# Patient Record
Sex: MALE | Race: BLACK OR AFRICAN AMERICAN | NOT HISPANIC OR LATINO
[De-identification: names, ages, dates, MRNs, and addresses within clinical notes are randomized per-mention and may not be internally consistent; named-entity substitution may affect disease eponyms.]

---

## 2017-03-22 ENCOUNTER — HOSPITAL (OUTPATIENT)
Dept: OTHER | Age: 27
End: 2017-03-22
Attending: EMERGENCY MEDICINE

## 2017-05-15 ENCOUNTER — HOSPITAL (OUTPATIENT)
Dept: OTHER | Age: 27
End: 2017-05-15
Attending: EMERGENCY MEDICINE

## 2017-05-16 PROBLEM — J45.909 ASTHMA: Status: ACTIVE | Noted: 2017-05-16

## 2017-05-16 PROBLEM — J45.21 MILD INTERMITTENT ASTHMA WITH ACUTE EXACERBATION: Status: ACTIVE | Noted: 2017-05-16

## 2017-06-01 PROBLEM — J45.21 MILD INTERMITTENT ASTHMA WITH ACUTE EXACERBATION: Status: RESOLVED | Noted: 2017-05-16 | Resolved: 2017-06-01

## 2017-06-01 PROBLEM — J45.909 ASTHMA: Status: RESOLVED | Noted: 2017-05-16 | Resolved: 2017-06-01

## 2017-10-24 ENCOUNTER — APPOINTMENT (OUTPATIENT)
Dept: GENERAL RADIOLOGY | Facility: HOSPITAL | Age: 27
End: 2017-10-24
Attending: EMERGENCY MEDICINE
Payer: COMMERCIAL

## 2017-10-24 ENCOUNTER — HOSPITAL ENCOUNTER (OUTPATIENT)
Facility: HOSPITAL | Age: 27
Setting detail: OBSERVATION
Discharge: HOME | End: 2017-10-27
Attending: EMERGENCY MEDICINE | Admitting: HOSPITALIST
Payer: COMMERCIAL

## 2017-10-24 DIAGNOSIS — J45.41 MODERATE PERSISTENT ASTHMA WITH EXACERBATION: Primary | ICD-10-CM

## 2017-10-24 PROBLEM — E87.6 HYPOKALEMIA: Status: ACTIVE | Noted: 2017-10-24

## 2017-10-24 PROCEDURE — 87999 UNLISTED MICROBIOLOGY PX: CPT

## 2017-10-24 PROCEDURE — 80053 COMPREHEN METABOLIC PANEL: CPT | Performed by: EMERGENCY MEDICINE

## 2017-10-24 PROCEDURE — 85025 COMPLETE CBC W/AUTO DIFF WBC: CPT | Performed by: EMERGENCY MEDICINE

## 2017-10-24 PROCEDURE — 71010 XR CHEST AP PORTABLE  (CPT=71010): CPT | Performed by: EMERGENCY MEDICINE

## 2017-10-24 PROCEDURE — 87798 DETECT AGENT NOS DNA AMP: CPT | Performed by: EMERGENCY MEDICINE

## 2017-10-24 PROCEDURE — 99285 EMERGENCY DEPT VISIT HI MDM: CPT

## 2017-10-24 PROCEDURE — 87581 M.PNEUMON DNA AMP PROBE: CPT | Performed by: EMERGENCY MEDICINE

## 2017-10-24 PROCEDURE — 83880 ASSAY OF NATRIURETIC PEPTIDE: CPT | Performed by: EMERGENCY MEDICINE

## 2017-10-24 PROCEDURE — 96375 TX/PRO/DX INJ NEW DRUG ADDON: CPT

## 2017-10-24 PROCEDURE — 93005 ELECTROCARDIOGRAM TRACING: CPT

## 2017-10-24 PROCEDURE — 85378 FIBRIN DEGRADE SEMIQUANT: CPT | Performed by: EMERGENCY MEDICINE

## 2017-10-24 PROCEDURE — 84484 ASSAY OF TROPONIN QUANT: CPT | Performed by: EMERGENCY MEDICINE

## 2017-10-24 PROCEDURE — 96365 THER/PROPH/DIAG IV INF INIT: CPT

## 2017-10-24 PROCEDURE — 87486 CHLMYD PNEUM DNA AMP PROBE: CPT | Performed by: EMERGENCY MEDICINE

## 2017-10-24 PROCEDURE — 87633 RESP VIRUS 12-25 TARGETS: CPT | Performed by: EMERGENCY MEDICINE

## 2017-10-24 PROCEDURE — 93010 ELECTROCARDIOGRAM REPORT: CPT

## 2017-10-24 PROCEDURE — 94644 CONT INHLJ TX 1ST HOUR: CPT

## 2017-10-24 RX ORDER — METHYLPREDNISOLONE SODIUM SUCCINATE 125 MG/2ML
125 INJECTION, POWDER, LYOPHILIZED, FOR SOLUTION INTRAMUSCULAR; INTRAVENOUS ONCE
Status: COMPLETED | OUTPATIENT
Start: 2017-10-24 | End: 2017-10-24

## 2017-10-25 PROCEDURE — 85025 COMPLETE CBC W/AUTO DIFF WBC: CPT | Performed by: HOSPITALIST

## 2017-10-25 PROCEDURE — 80048 BASIC METABOLIC PNL TOTAL CA: CPT | Performed by: HOSPITALIST

## 2017-10-25 PROCEDURE — 94640 AIRWAY INHALATION TREATMENT: CPT

## 2017-10-25 PROCEDURE — 94644 CONT INHLJ TX 1ST HOUR: CPT

## 2017-10-25 RX ORDER — HEPARIN SODIUM 5000 [USP'U]/ML
5000 INJECTION, SOLUTION INTRAVENOUS; SUBCUTANEOUS EVERY 12 HOURS SCHEDULED
Status: DISCONTINUED | OUTPATIENT
Start: 2017-10-25 | End: 2017-10-27

## 2017-10-25 RX ORDER — IPRATROPIUM BROMIDE AND ALBUTEROL SULFATE 2.5; .5 MG/3ML; MG/3ML
3 SOLUTION RESPIRATORY (INHALATION)
Status: DISCONTINUED | OUTPATIENT
Start: 2017-10-25 | End: 2017-10-25

## 2017-10-25 RX ORDER — IPRATROPIUM BROMIDE AND ALBUTEROL SULFATE 2.5; .5 MG/3ML; MG/3ML
3 SOLUTION RESPIRATORY (INHALATION)
Status: DISCONTINUED | OUTPATIENT
Start: 2017-10-25 | End: 2017-10-26

## 2017-10-25 RX ORDER — METHYLPREDNISOLONE SODIUM SUCCINATE 125 MG/2ML
60 INJECTION, POWDER, LYOPHILIZED, FOR SOLUTION INTRAMUSCULAR; INTRAVENOUS EVERY 6 HOURS
Status: COMPLETED | OUTPATIENT
Start: 2017-10-25 | End: 2017-10-26

## 2017-10-25 RX ORDER — CETIRIZINE HYDROCHLORIDE 10 MG/1
10 TABLET ORAL DAILY
Status: DISCONTINUED | OUTPATIENT
Start: 2017-10-25 | End: 2017-10-27

## 2017-10-25 RX ORDER — ALBUTEROL SULFATE 2.5 MG/3ML
2.5 SOLUTION RESPIRATORY (INHALATION)
Status: DISCONTINUED | OUTPATIENT
Start: 2017-10-25 | End: 2017-10-25

## 2017-10-25 RX ORDER — ACETAMINOPHEN 325 MG/1
650 TABLET ORAL EVERY 6 HOURS PRN
Status: DISCONTINUED | OUTPATIENT
Start: 2017-10-25 | End: 2017-10-27

## 2017-10-25 RX ORDER — FLUTICASONE PROPIONATE AND SALMETEROL 250; 50 UG/1; UG/1
1 POWDER RESPIRATORY (INHALATION) DAILY
Status: ON HOLD | COMMUNITY
End: 2017-10-27

## 2017-10-25 RX ORDER — ALBUTEROL SULFATE 2.5 MG/3ML
2.5 SOLUTION RESPIRATORY (INHALATION)
Status: DISCONTINUED | OUTPATIENT
Start: 2017-10-25 | End: 2017-10-26

## 2017-10-25 NOTE — PAYOR COMM NOTE
--------------  ADMISSION REVIEW     Payor: Librado Houser [facility]    10/24    ED LATE    Dyspnea WILLIAMS SOB      Stated Complaint: williams          Patient is a 27-year-old with a history of asthma who presents for evaluation of cough and difficulty breathing.     It has bee limits   TROPONIN I - Normal   PRO BETA NATRIURETIC PEPTIDE - Normal   D-DIMER - Normal     Narrative:      FEU = Fibrinogen Equivalent Units.     D-Dimer results of less than 0.5 ug/mL (FEU) have been shown to contribute to the exclusion of venous thrombo

## 2017-10-25 NOTE — ED INITIAL ASSESSMENT (HPI)
Pt with c/o cold sx that started last night. Pt states has used inhaler last night without relief. Pt arrived per ems. Pt given tx x2 per medics.

## 2017-10-25 NOTE — ED PROVIDER NOTES
Patient Seen in: [facility] Emergency Department    History   Patient presents with:  Dyspnea WILLIAMS SOB (respiratory)    Stated Complaint: williams    HPI    Patient is a 31-year-old with a history of asthma who presents for evaluation of cough and difficu None (Room air) [10/24/17 2148]    Current:/86   Pulse 105   Temp 98.3 °F (36.8 °C) (Temporal)   Resp 20   Ht 182.9 cm (6')   Wt 104.3 kg   SpO2 94%   BMI 31.19 kg/m²         Physical Exam    General: Patient is awake, alert in  mild to moderate resp view results for these tests on the individual orders. EKG    Rate, intervals and axes as noted on EKG Report.   Rate: 99  Rhythm: Sinus Rhythm  Reading: Unspecific ST and T-wave changes         Chest x-ray: No acute process  CONCLUSION:  No acute disea

## 2017-10-25 NOTE — PLAN OF CARE
METABOLIC/FLUID AND ELECTROLYTES - ADULT    • Electrolytes maintained within normal limits Progressing        Patient/Family Goals    • Patient/Family Long Term Goal Progressing    • Patient/Family Short Term Goal Progressing

## 2017-10-25 NOTE — RESPIRATORY THERAPY NOTE
Assessed this AM for increased frequency in nebulizers. When arrived in room, patient stated he was feeling like he needed another breathing treatment. Saturating 89-91% on room air. Received continuous nebs and steroids in ER. No steroids since.  After ad

## 2017-10-25 NOTE — CM/SW NOTE
Patient is independent. Has own nebulizer. Denies needs/concerns for d/c.     10/25/17 1400   CM/SW Screening   Referral [id number] staff; Chart review;Nursing rounds   Patient Status Prior to Admission   Independent with A

## 2017-10-25 NOTE — H&P
[facility]  History & Physical    Juanita Parker Patient Status:  Observation    7/3/1990 MRN [id number]   [facility] 5NW-A Attending Smita Raya MD   Hosp Day # 0 PCP Vanessa Samuel MD     Cc: shortness of breath    History of (six) hours as needed for Wheezing or Shortness of Breath.  Disp: 1 Inhaler Rfl: 0 10/25/2017 at Unknown time   Albuterol Sulfate HFA (PROAIR HFA) 108 (90 Base) MCG/ACT Inhalation Aero Soln Inhale 2 puffs into the lungs every 4 (four) hours as needed for [facility] TECHNIQUE:  AP chest radiograph was obtained. COMPARISON:  [facility] , XR CHEST PA + LAT CHEST (CPT=71020), 11/05/2014, 19:45. INDICATIONS:  williams  PATIENT STATED HISTORY: (As transcribed by Technologist)  Asthma related dyspnea.     FINDINGS:  The heart and m tele in 120s   Abdomen: soft, nontender, nondistended, intact bowel sounds  Extremities: no calf tenderness, no pedal edema, intact pulses  Neurologic: no focal neurological deficits  Musculoskeletal: moves all 4 extremities, gait deferred   Psychiatric: a

## 2017-10-25 NOTE — PROGRESS NOTES
Pt is a 25 y/o male admitted with SOB secondary to acute asthma exacerbation. 2 L02 via NC,02 sats 93-96%. bilateral wheezing,nebs,iv steroids and IS.pt  is able to do his ADLs. no sob,pain,N/V noted. +RHinoon droplet isolation.

## 2017-10-25 NOTE — PLAN OF CARE
Problem: Patient/Family Goals  Goal: Patient/Family Short Term Goal  Patient's Short Term Goal: 10/24 nights- control asthma  10/25-resolve asthma exacerbation  Interventions:   10/25-iv steroids.   - take medications  - See additional Care Plan goals for s

## 2017-10-25 NOTE — PLAN OF CARE
METABOLIC/FLUID AND ELECTROLYTES - ADULT    • Electrolytes maintained within normal limits Progressing        Patient/Family Goals    • Patient/Family Long Term Goal Progressing    • Patient/Family Short Term Goal Progressing        Pt received A&O.   Inspi

## 2017-10-25 NOTE — CONSULTS
[facility] Patient Status:  Observation    7/3/1990 MRN [id number]   [facility] 5NW-A Attending Cheryle Mclean MD   Hosp Day # 0 PCP Margaret Lockett MD     Date of Admission: 10/24/2017  9:23 PM  Admission Diagnosis: Inhalation Aero Soln 6/15**INHALE 2 PUFFS BY MOUTH EVERY 4 HOURS AS NEEDED FOR WHEEZING Disp: 8.5 Inhaler Rfl: 2   Albuterol Sulfate  (90 Base) MCG/ACT Inhalation Aero Soln Inhale 1 puff into the lungs every 6 (six) hours as needed for Wheezing or S denies polydypsia, polyuria, cold/heat intolerance  Hematologic/lymphatic: denies easy bruising, new blood clots, or lymphedema  Allergic/immunologic: denies hay fever, hives, or new allergies       OBJECTIVE:  Patient Vitals for the past 24 hrs:   BP Temp --    ALT  36   --    BILT  0.5   --    TP  7.5   --      Recent Labs   Lab  10/24/17   2149  10/25/17   0617   RBC  5.62  5.65   HGB  15.2  15.5   HCT  46.4  46.9   MCV  82.6  83.0   MCH  27.0  27.4   MCHC  32.8  33.0   RDW  12.9  12.9   NEPRELIM  7.01*

## 2017-10-26 PROCEDURE — 82785 ASSAY OF IGE: CPT | Performed by: INTERNAL MEDICINE

## 2017-10-26 PROCEDURE — 94640 AIRWAY INHALATION TREATMENT: CPT

## 2017-10-26 PROCEDURE — 94664 DEMO&/EVAL PT USE INHALER: CPT

## 2017-10-26 PROCEDURE — 36415 COLL VENOUS BLD VENIPUNCTURE: CPT | Performed by: INTERNAL MEDICINE

## 2017-10-26 RX ORDER — PREDNISONE 20 MG/1
40 TABLET ORAL
Status: DISCONTINUED | OUTPATIENT
Start: 2017-10-27 | End: 2017-10-27

## 2017-10-26 RX ORDER — IPRATROPIUM BROMIDE AND ALBUTEROL SULFATE 2.5; .5 MG/3ML; MG/3ML
3 SOLUTION RESPIRATORY (INHALATION)
Status: DISCONTINUED | OUTPATIENT
Start: 2017-10-26 | End: 2017-10-27

## 2017-10-26 NOTE — PLAN OF CARE
METABOLIC/FLUID AND ELECTROLYTES - ADULT    • Electrolytes maintained within normal limits Progressing        Patient/Family Goals    • Patient/Family Long Term Goal Progressing    • Patient/Family Short Term Goal Progressing          A/Ox4. VSS, afebrile.

## 2017-10-26 NOTE — PROGRESS NOTES
[facility] Patient Status:  Observation    7/3/1990 MRN [id number]   [facility] 5NW-A Attending Katya Garibay MD   Hosp Day # 0 PCP Cruz Cannon MD     SUBJECTIVE: No acute events overnight.   Patient states br 141  138   K  3.5*  4.3   CL  108  107   CO2  27.0  23.0   ALKPHO  48   --    AST  27   --    ALT  36   --    BILT  0.5   --    TP  7.5   --      Recent Labs   Lab  10/24/17   2149  10/25/17   0617   RBC  5.62  5.65   HGB  15.2  15.5   HCT  46.4  46.9   M

## 2017-10-26 NOTE — PROGRESS NOTES
[facility]  Progress Note    [facility] Patient Status:  Observation    7/3/1990 MRN [id number]   [facility] 5NW-A Attending Loren Maher MD   Hosp Day # 0 PCP Elena Lozoya MD     Cc: follow up    Subjective:      Mr. Nikki Ormond appreciate pulmonary consult  - RVP + rhinovirus / enterovirus  - IV steroids per pulm consult  - bronchodilators, LABA  - wean O2 as able     # Sinus tachycardia  - 2/2 to respiratory treatments     # Hyperglycemia  - 2/2 to IV steroids     # Hypokalemia,

## 2017-10-26 NOTE — RESPIRATORY THERAPY NOTE
Progressing. Patient to continue on BD, and follow protocol. Oxygen saturations are 98% on 2L  Patient getting neb via aerosol mask.      Patient continues to have a high pulse 115-130

## 2017-10-26 NOTE — PLAN OF CARE
RESPIRATORY - ADULT    • Achieves optimal ventilation and oxygenation Progressing          PROBLEM: Asthma exacerbation    ASSESSMENT: Pt is A&O x4. VSS, afebrile. Maintaining O2 sat > 92% on RA, denies SOB. Lungs clear, diminished.  O2 walk completed (see

## 2017-10-27 VITALS
DIASTOLIC BLOOD PRESSURE: 80 MMHG | WEIGHT: 231 LBS | RESPIRATION RATE: 14 BRPM | TEMPERATURE: 98 F | OXYGEN SATURATION: 93 % | BODY MASS INDEX: 31.29 KG/M2 | HEIGHT: 72 IN | SYSTOLIC BLOOD PRESSURE: 136 MMHG | HEART RATE: 89 BPM

## 2017-10-27 PROCEDURE — 94640 AIRWAY INHALATION TREATMENT: CPT

## 2017-10-27 PROCEDURE — 94664 DEMO&/EVAL PT USE INHALER: CPT

## 2017-10-27 RX ORDER — ALBUTEROL SULFATE 2.5 MG/3ML
2.5 SOLUTION RESPIRATORY (INHALATION) EVERY 4 HOURS PRN
Qty: 60 AMPULE | Refills: 3 | Status: SHIPPED | OUTPATIENT
Start: 2017-10-27 | End: 2018-01-25

## 2017-10-27 RX ORDER — FLUTICASONE PROPIONATE AND SALMETEROL 250; 50 UG/1; UG/1
1 POWDER RESPIRATORY (INHALATION) EVERY 12 HOURS SCHEDULED
Qty: 1 PACKAGE | Refills: 5 | Status: SHIPPED | OUTPATIENT
Start: 2017-10-27 | End: 2018-03-07 | Stop reason: DRUGHIGH

## 2017-10-27 RX ORDER — PREDNISONE 10 MG/1
TABLET ORAL
Qty: 30 TABLET | Refills: 0 | Status: SHIPPED | OUTPATIENT
Start: 2017-10-27 | End: 2017-11-13 | Stop reason: ALTCHOICE

## 2017-10-27 NOTE — PLAN OF CARE
METABOLIC/FLUID AND ELECTROLYTES - ADULT    • Electrolytes maintained within normal limits Progressing        Patient/Family Goals    • Patient/Family Short Term Goal Progressing        RESPIRATORY - ADULT    • Achieves optimal ventilation and oxygenation

## 2017-10-27 NOTE — PROGRESS NOTES
NURSING DISCHARGE NOTE    Discharged Home via Wheelchair. Accompanied by Family member and Support staff  Belongings Taken by patient/family. Pt and mother received discharge instructions and education. Prescriptions at pharmacy. PIV removed.  Etheleen Schaumann

## 2017-10-27 NOTE — PROGRESS NOTES
Multidisciplinary Discharge Rounds held 10/27/2017. Treatment team members present today include , , Charge Nurse,  Nurse, RT, PT and Pharmacy caring for Time Rebolledo.      Other care providers present:    Patient Active Problem

## 2017-10-27 NOTE — PROGRESS NOTES
[address] [facility] Patient Status:  Observation    7/3/1990 MRN [id number]   [facility] 5NW-A Attending Yevgeniy Barroso MD   Hosp Day # 0 PCP Leslie Peralta MD     SUBJECTIVE:  Feels better today. Still has mild cough.      OBJ

## 2017-10-27 NOTE — DISCHARGE SUMMARY
General Medicine Discharge Summary     Patient ID:  Luigi Conklin  24year old  7/3/1990    Admit date: 10/24/2017    Discharge date and time: 10/27/2017    Attending Physician: Alison Jennings MD     [facility] DELTASONE  4 pills daily x 3 days, then 3 pills daily x 3 days, then 2 pills daily x 3 days, then 1 pill daily x 3 days, then end.         CHANGE how you take these medications    * Albuterol Sulfate  (90 Base) MCG/ACT Aers  Commonly known as:  [facility] - [facility] [address]  [phone], [address] [place] [address]    Phone:  [phone]   · albuterol sulfate (2.5 MG/3ML) 0.083% Nebu  · fluticasone-salmeterol 250-50 MCG/DOSE Aepb  · predniSONE 10 MG Tabs

## 2018-01-10 NOTE — PLAN OF CARE
Problem: RESPIRATORY - ADULT  Goal: Achieves optimal ventilation and oxygenation  INTERVENTIONS:  - Assess for changes in respiratory status  - Assess for changes in mentation and behavior  - Position to facilitate oxygenation and minimize respiratory effo same name as above

## 2018-02-01 PROBLEM — D17.1 LIPOMA OF BACK: Status: ACTIVE | Noted: 2018-02-01

## 2018-02-09 PROCEDURE — 88304 TISSUE EXAM BY PATHOLOGIST: CPT

## 2018-07-20 ENCOUNTER — APPOINTMENT (OUTPATIENT)
Dept: GENERAL RADIOLOGY | Facility: HOSPITAL | Age: 28
End: 2018-07-20
Attending: STUDENT IN AN ORGANIZED HEALTH CARE EDUCATION/TRAINING PROGRAM
Payer: COMMERCIAL

## 2018-07-20 ENCOUNTER — HOSPITAL ENCOUNTER (EMERGENCY)
Facility: HOSPITAL | Age: 28
Discharge: HOME | End: 2018-07-20
Attending: STUDENT IN AN ORGANIZED HEALTH CARE EDUCATION/TRAINING PROGRAM
Payer: COMMERCIAL

## 2018-07-20 VITALS
WEIGHT: 230 LBS | SYSTOLIC BLOOD PRESSURE: 130 MMHG | RESPIRATION RATE: 20 BRPM | DIASTOLIC BLOOD PRESSURE: 79 MMHG | BODY MASS INDEX: 31.15 KG/M2 | OXYGEN SATURATION: 97 % | HEIGHT: 72 IN | TEMPERATURE: 98 F | HEART RATE: 66 BPM

## 2018-07-20 DIAGNOSIS — J06.9 UPPER RESPIRATORY TRACT INFECTION, UNSPECIFIED TYPE: ICD-10-CM

## 2018-07-20 DIAGNOSIS — J45.901 ASTHMA WITH ACUTE EXACERBATION, UNSPECIFIED ASTHMA SEVERITY, UNSPECIFIED WHETHER PERSISTENT: Primary | ICD-10-CM

## 2018-07-20 PROCEDURE — 99284 EMERGENCY DEPT VISIT MOD MDM: CPT

## 2018-07-20 PROCEDURE — 94640 AIRWAY INHALATION TREATMENT: CPT

## 2018-07-20 PROCEDURE — 94644 CONT INHLJ TX 1ST HOUR: CPT

## 2018-07-20 PROCEDURE — 71046 X-RAY EXAM CHEST 2 VIEWS: CPT | Performed by: STUDENT IN AN ORGANIZED HEALTH CARE EDUCATION/TRAINING PROGRAM

## 2018-07-20 RX ORDER — PREDNISONE 20 MG/1
60 TABLET ORAL DAILY
Qty: 15 TABLET | Refills: 0 | Status: SHIPPED | OUTPATIENT
Start: 2018-07-20 | End: 2018-07-25

## 2018-07-20 RX ORDER — ALBUTEROL SULFATE 2.5 MG/3ML
2.5 SOLUTION RESPIRATORY (INHALATION) EVERY 4 HOURS PRN
Qty: 30 AMPULE | Refills: 0 | Status: SHIPPED | OUTPATIENT
Start: 2018-07-20 | End: 2018-08-23

## 2018-07-20 RX ORDER — PREDNISONE 20 MG/1
60 TABLET ORAL ONCE
Status: COMPLETED | OUTPATIENT
Start: 2018-07-20 | End: 2018-07-20

## 2018-07-20 RX ORDER — ALBUTEROL SULFATE 90 UG/1
2 AEROSOL, METERED RESPIRATORY (INHALATION) EVERY 4 HOURS PRN
Qty: 1 INHALER | Refills: 0 | Status: SHIPPED | OUTPATIENT
Start: 2018-07-20 | End: 2018-08-19

## 2018-07-20 RX ORDER — IPRATROPIUM BROMIDE AND ALBUTEROL SULFATE 2.5; .5 MG/3ML; MG/3ML
3 SOLUTION RESPIRATORY (INHALATION) ONCE
Status: COMPLETED | OUTPATIENT
Start: 2018-07-20 | End: 2018-07-20

## 2018-07-20 NOTE — ED PROVIDER NOTES
Patient Seen in: [facility] Emergency Department    History   Patient presents with:  Dyspnea FALLON SOB (respiratory)    Stated Complaint: asthma    HPI    Patient is a 51-year-old male who presents emergency department with difficulty breathing.   Temo Jackson Cardiovascular: Normal rate, regular rhythm, normal heart sounds and intact distal pulses. Exam reveals no gallop and no friction rub. No murmur heard.   Pulmonary/Chest: Tachypnea, increased work of breathing, severe inspiratory and expiratory wheezi daily.  Qty: 15 tablet Refills: 0    !! Albuterol Sulfate  (90 Base) MCG/ACT Inhalation Aero Soln  Inhale 2 puffs into the lungs every 4 (four) hours as needed for Wheezing.   Qty: 1 Inhaler Refills: 0    !! albuterol sulfate (2.5 MG/3ML) 0.083% Annia Gnozalez

## 2018-09-21 PROCEDURE — 88304 TISSUE EXAM BY PATHOLOGIST: CPT | Performed by: SURGERY

## 2019-01-11 ENCOUNTER — HOSPITAL ENCOUNTER (EMERGENCY)
Facility: HOSPITAL | Age: 29
Discharge: HOME | End: 2019-01-11
Attending: EMERGENCY MEDICINE
Payer: COMMERCIAL

## 2019-01-11 ENCOUNTER — APPOINTMENT (OUTPATIENT)
Dept: GENERAL RADIOLOGY | Facility: HOSPITAL | Age: 29
End: 2019-01-11
Attending: EMERGENCY MEDICINE
Payer: COMMERCIAL

## 2019-01-11 VITALS
HEIGHT: 72 IN | SYSTOLIC BLOOD PRESSURE: 122 MMHG | DIASTOLIC BLOOD PRESSURE: 70 MMHG | HEART RATE: 78 BPM | WEIGHT: 250 LBS | BODY MASS INDEX: 33.86 KG/M2 | TEMPERATURE: 98 F | OXYGEN SATURATION: 97 % | RESPIRATION RATE: 20 BRPM

## 2019-01-11 DIAGNOSIS — J45.901 ASTHMA EXACERBATION, MILD: Primary | ICD-10-CM

## 2019-01-11 PROCEDURE — 87502 INFLUENZA DNA AMP PROBE: CPT | Performed by: EMERGENCY MEDICINE

## 2019-01-11 PROCEDURE — 87798 DETECT AGENT NOS DNA AMP: CPT | Performed by: EMERGENCY MEDICINE

## 2019-01-11 PROCEDURE — 99284 EMERGENCY DEPT VISIT MOD MDM: CPT | Performed by: EMERGENCY MEDICINE

## 2019-01-11 PROCEDURE — 94640 AIRWAY INHALATION TREATMENT: CPT

## 2019-01-11 PROCEDURE — 87999 UNLISTED MICROBIOLOGY PX: CPT

## 2019-01-11 PROCEDURE — 71046 X-RAY EXAM CHEST 2 VIEWS: CPT | Performed by: EMERGENCY MEDICINE

## 2019-01-11 RX ORDER — PREDNISONE 20 MG/1
60 TABLET ORAL DAILY
Qty: 15 TABLET | Refills: 0 | Status: SHIPPED | OUTPATIENT
Start: 2019-01-11 | End: 2019-01-24 | Stop reason: DRUGHIGH

## 2019-01-11 RX ORDER — PREDNISONE 20 MG/1
60 TABLET ORAL ONCE
Status: COMPLETED | OUTPATIENT
Start: 2019-01-11 | End: 2019-01-11

## 2019-01-11 RX ORDER — IPRATROPIUM BROMIDE AND ALBUTEROL SULFATE 2.5; .5 MG/3ML; MG/3ML
3 SOLUTION RESPIRATORY (INHALATION) ONCE
Status: COMPLETED | OUTPATIENT
Start: 2019-01-11 | End: 2019-01-11

## 2019-01-11 RX ORDER — OSELTAMIVIR PHOSPHATE 75 MG/1
75 CAPSULE ORAL 2 TIMES DAILY
Qty: 10 CAPSULE | Refills: 0 | Status: SHIPPED | OUTPATIENT
Start: 2019-01-11 | End: 2019-01-24 | Stop reason: ALTCHOICE

## 2019-01-11 RX ORDER — ALBUTEROL SULFATE 90 UG/1
2 AEROSOL, METERED RESPIRATORY (INHALATION) EVERY 4 HOURS PRN
Qty: 1 INHALER | Refills: 0 | Status: SHIPPED | OUTPATIENT
Start: 2019-01-11 | End: 2019-01-21 | Stop reason: ALTCHOICE

## 2019-01-11 NOTE — ED INITIAL ASSESSMENT (HPI)
Pt presents with SOB for 2 days. Hx Asthma. Using inhaler and nebs with minimal improvement. Reports productive cough with yellow sputum. No fever or chills.

## 2019-01-11 NOTE — ED PROVIDER NOTES
Patient Seen in: [facility] Emergency Department    History   Patient presents with:  Cough/URI    Stated Complaint: Cough, congestion x 2 days    HPI    Patient is a pleasant 28-year-old gentleman with a history of asthma presents with cough and co throughout. No rebound or guarding  Extremities: No clubbing/cyanosis/edema. Skin: No rashes, no pallor  Neuro: Awake oriented ×3.   Nonfocal.  Good strength throughout    ED Course     Labs Reviewed   INFLUENZA A+B, RT PCR W/RFLX TO INFLUENZA H1N1

## 2019-01-24 PROCEDURE — 87086 URINE CULTURE/COLONY COUNT: CPT | Performed by: INTERNAL MEDICINE

## 2019-05-27 ENCOUNTER — HOSPITAL ENCOUNTER (INPATIENT)
Facility: HOSPITAL | Age: 29
LOS: 2 days | Discharge: HOME | DRG: 202 | End: 2019-05-29
Attending: EMERGENCY MEDICINE | Admitting: INTERNAL MEDICINE
Payer: COMMERCIAL

## 2019-05-27 ENCOUNTER — APPOINTMENT (OUTPATIENT)
Dept: GENERAL RADIOLOGY | Facility: HOSPITAL | Age: 29
DRG: 202 | End: 2019-05-27
Attending: EMERGENCY MEDICINE
Payer: COMMERCIAL

## 2019-05-27 DIAGNOSIS — J45.20 MILD INTERMITTENT ASTHMA WITHOUT COMPLICATION: ICD-10-CM

## 2019-05-27 DIAGNOSIS — J06.9 UPPER RESPIRATORY TRACT INFECTION, UNSPECIFIED TYPE: ICD-10-CM

## 2019-05-27 DIAGNOSIS — J20.9 ACUTE BRONCHITIS, UNSPECIFIED ORGANISM: Primary | ICD-10-CM

## 2019-05-27 PROCEDURE — 99285 EMERGENCY DEPT VISIT HI MDM: CPT

## 2019-05-27 PROCEDURE — 94640 AIRWAY INHALATION TREATMENT: CPT

## 2019-05-27 PROCEDURE — 94644 CONT INHLJ TX 1ST HOUR: CPT

## 2019-05-27 PROCEDURE — 87486 CHLMYD PNEUM DNA AMP PROBE: CPT | Performed by: HOSPITALIST

## 2019-05-27 PROCEDURE — 87581 M.PNEUMON DNA AMP PROBE: CPT | Performed by: HOSPITALIST

## 2019-05-27 PROCEDURE — 85025 COMPLETE CBC W/AUTO DIFF WBC: CPT | Performed by: HOSPITALIST

## 2019-05-27 PROCEDURE — 96374 THER/PROPH/DIAG INJ IV PUSH: CPT

## 2019-05-27 PROCEDURE — 71045 X-RAY EXAM CHEST 1 VIEW: CPT | Performed by: EMERGENCY MEDICINE

## 2019-05-27 PROCEDURE — 94664 DEMO&/EVAL PT USE INHALER: CPT

## 2019-05-27 PROCEDURE — 87633 RESP VIRUS 12-25 TARGETS: CPT | Performed by: HOSPITALIST

## 2019-05-27 PROCEDURE — 96361 HYDRATE IV INFUSION ADD-ON: CPT

## 2019-05-27 PROCEDURE — 87798 DETECT AGENT NOS DNA AMP: CPT | Performed by: HOSPITALIST

## 2019-05-27 PROCEDURE — 96375 TX/PRO/DX INJ NEW DRUG ADDON: CPT

## 2019-05-27 PROCEDURE — 80048 BASIC METABOLIC PNL TOTAL CA: CPT | Performed by: HOSPITALIST

## 2019-05-27 RX ORDER — ALBUTEROL SULFATE 90 UG/1
2 AEROSOL, METERED RESPIRATORY (INHALATION) EVERY 4 HOURS PRN
Qty: 1 INHALER | Refills: 0 | Status: SHIPPED | OUTPATIENT
Start: 2019-05-27 | End: 2019-05-29

## 2019-05-27 RX ORDER — CETIRIZINE HYDROCHLORIDE 10 MG/1
10 TABLET ORAL DAILY
Status: DISCONTINUED | OUTPATIENT
Start: 2019-05-27 | End: 2019-05-29

## 2019-05-27 RX ORDER — PANTOPRAZOLE SODIUM 40 MG/1
40 TABLET, DELAYED RELEASE ORAL
Status: DISCONTINUED | OUTPATIENT
Start: 2019-05-27 | End: 2019-05-29

## 2019-05-27 RX ORDER — MAGNESIUM SULFATE HEPTAHYDRATE 40 MG/ML
2 INJECTION, SOLUTION INTRAVENOUS ONCE
Status: DISCONTINUED | OUTPATIENT
Start: 2019-05-27 | End: 2019-05-27

## 2019-05-27 RX ORDER — MONTELUKAST SODIUM 10 MG/1
10 TABLET ORAL NIGHTLY
Status: DISCONTINUED | OUTPATIENT
Start: 2019-05-27 | End: 2019-05-29

## 2019-05-27 RX ORDER — GUAIFENESIN 600 MG
600 TABLET, EXTENDED RELEASE 12 HR ORAL 2 TIMES DAILY
Qty: 14 TABLET | Refills: 0 | Status: SHIPPED | OUTPATIENT
Start: 2019-05-27 | End: 2019-05-29

## 2019-05-27 RX ORDER — DIPHENHYDRAMINE HYDROCHLORIDE 50 MG/ML
25 INJECTION INTRAMUSCULAR; INTRAVENOUS ONCE
Status: COMPLETED | OUTPATIENT
Start: 2019-05-27 | End: 2019-05-27

## 2019-05-27 RX ORDER — BENZONATATE 100 MG/1
100 CAPSULE ORAL 3 TIMES DAILY PRN
Qty: 30 CAPSULE | Refills: 0 | Status: SHIPPED | OUTPATIENT
Start: 2019-05-27 | End: 2019-05-29

## 2019-05-27 RX ORDER — METHYLPREDNISOLONE SODIUM SUCCINATE 125 MG/2ML
125 INJECTION, POWDER, LYOPHILIZED, FOR SOLUTION INTRAMUSCULAR; INTRAVENOUS EVERY 6 HOURS
Status: DISCONTINUED | OUTPATIENT
Start: 2019-05-27 | End: 2019-05-28

## 2019-05-27 RX ORDER — BENZONATATE 100 MG/1
100 CAPSULE ORAL 3 TIMES DAILY PRN
Status: DISCONTINUED | OUTPATIENT
Start: 2019-05-27 | End: 2019-05-29

## 2019-05-27 RX ORDER — HEPARIN SODIUM 5000 [USP'U]/ML
5000 INJECTION, SOLUTION INTRAVENOUS; SUBCUTANEOUS EVERY 8 HOURS SCHEDULED
Status: DISCONTINUED | OUTPATIENT
Start: 2019-05-27 | End: 2019-05-29

## 2019-05-27 RX ORDER — PREDNISONE 20 MG/1
40 TABLET ORAL DAILY
Qty: 10 TABLET | Refills: 0 | Status: SHIPPED | OUTPATIENT
Start: 2019-05-27 | End: 2019-05-28

## 2019-05-27 RX ORDER — METHYLPREDNISOLONE SODIUM SUCCINATE 125 MG/2ML
125 INJECTION, POWDER, LYOPHILIZED, FOR SOLUTION INTRAMUSCULAR; INTRAVENOUS ONCE
Status: COMPLETED | OUTPATIENT
Start: 2019-05-27 | End: 2019-05-27

## 2019-05-27 RX ORDER — GUAIFENESIN 600 MG
600 TABLET, EXTENDED RELEASE 12 HR ORAL ONCE
Status: COMPLETED | OUTPATIENT
Start: 2019-05-27 | End: 2019-05-27

## 2019-05-27 RX ORDER — IPRATROPIUM BROMIDE AND ALBUTEROL SULFATE 2.5; .5 MG/3ML; MG/3ML
3 SOLUTION RESPIRATORY (INHALATION) EVERY 4 HOURS PRN
Status: DISCONTINUED | OUTPATIENT
Start: 2019-05-27 | End: 2019-05-27

## 2019-05-27 RX ORDER — IPRATROPIUM BROMIDE AND ALBUTEROL SULFATE 2.5; .5 MG/3ML; MG/3ML
3 SOLUTION RESPIRATORY (INHALATION)
Status: DISCONTINUED | OUTPATIENT
Start: 2019-05-27 | End: 2019-05-28

## 2019-05-27 RX ORDER — BENZONATATE 200 MG/1
200 CAPSULE ORAL ONCE
Status: COMPLETED | OUTPATIENT
Start: 2019-05-27 | End: 2019-05-27

## 2019-05-27 NOTE — CONSULTS
Pulmonary H&P/Consult       NAME: Eddie Sagastume - ROOM: [address] [place] [address] MRN: [id number] - Age: 34year old - :  7/3/1990    Date of Admission: 2019  3:11 AM  Admission Diagnosis: Acute bronchitis, unspecified organism [J20.9]  Upper respiratory tract in puffs into the lungs every 6 (six) hours as needed for Wheezing. Disp: 3 Inhaler Rfl: 3 5/27/2019 at Unknown time   Tiotropium Bromide Monohydrate (SPIRIVA RESPIMAT) 1.25 MCG/ACT Inhalation Aero Soln Inhale 2 sprays into the lungs daily.  Disp: 3 Inhaler Rf file        Attends meetings of clubs or organizations: Not on file        Relationship status: Not on file      Intimate partner violence:        Fear of current or ex partner: Not on file        Emotionally abused: Not on file        Physically abused: N Sodium (SINGULAIR) 10 MG Oral Tab Take 1 tablet (10 mg total) by mouth nightly.  Disp: 90 tablet Rfl: 1   cetirizine 10 MG Oral Tab 1 tablet q hs Disp: 90 tablet Rfl: 3       Scheduled Medication:  • Ipratropium Bromide       • albuterol Sulfate       • Met Temp 97.9 °F (36.6 °C) (Oral)   Resp 24   Ht 6' (1.829 m)   Wt 245 lb (111.1 kg)   SpO2 92%   BMI 33.23 kg/m²     General Appearance:    Alert, cooperative, no distress, appears stated age   Head:    Normocephalic, without obvious abnormality, atraumatic steroids  -Breo/Incruse  -BD protocol- attempt to taper  2.  Eosinophilic Asthma  -cont nucala as opt                   Shweta Estrella [facility]  [facility]

## 2019-05-27 NOTE — RESPIRATORY THERAPY NOTE
Received pt on 2L NC. Breath sound diminished/exp wheezing pre/post, peak flow 220/320. Pt feels better.  Will continue bronchodilator Neb Tx Q4

## 2019-05-27 NOTE — H&P
[facility] hospitalist H+P  PCP Max Smith MD  CC SOB, wheezing  HPI 35 yo male with hx of asthma presented with c/o SOB and wheezing. Per pt symptoms started yesterday, his brother was sick. Per pt no fever but he is having dry cough.  No chest pain, no he Not on file        Active member of club or organization: Not on file        Attends meetings of clubs or organizations: Not on file        Relationship status: Not on file      Intimate partner violence:        Fear of current or ex partner: Not on file no acute distress  HEENT; mmm, anicteric, OP clear, EOMI  Neck no JVD  CV: RRR, nl S1/S2  Pulm: + expiratory wheezing throughout  Abd; soft, not tender, +BS  Ext: no calf tenderness b/l  Neuro CN II-XII grossly intact, strenght 5/5 throughout  Psych calm,

## 2019-05-27 NOTE — PROGRESS NOTES
NURSING ADMISSION NOTE      Patient admitted via Cart  Oriented to room. Safety precautions initiated. Bed in low position. Call light in reach. Received pt from ER. Admission navigator completed by charge RN. Alert and oriented x4. VSS.  Pt jacoby

## 2019-05-27 NOTE — ED PROVIDER NOTES
Patient Seen in: [facility] Emergency Department    History   Patient presents with:  Dyspnea WILLIAMS SOB (respiratory)    Stated Complaint: williams    HPI  Is a 28-year-old male past medical history of asthma now presents ED with complaints of shortness of SpO2 94 %   O2 Device None (Room air)       Current:/77   Pulse 110   Temp 98.6 °F (37 °C) (Oral)   Resp 16   Ht 182.9 cm (6')   Wt 111.1 kg   SpO2 97%   BMI 33.23 kg/m²         Physical Exam   Constitutional: He is oriented to person, place, and t decide then. S/o to oncoming physician to dispo as appropriate.                Disposition and Plan     Clinical Impression:  Acute bronchitis, unspecified organism  (primary encounter diagnosis)  Upper respiratory tract infection, unspecified type    Dispo

## 2019-05-27 NOTE — ED INITIAL ASSESSMENT (HPI)
Pt presents to ed via ems c/o williams that started this evening. Pt states hx of asthma, took inhaler at home without relief. Pt received 2 duoneb treatments enroute per ems. Pt is a&ox4.

## 2019-05-28 PROCEDURE — 94640 AIRWAY INHALATION TREATMENT: CPT

## 2019-05-28 RX ORDER — PREDNISONE 20 MG/1
40 TABLET ORAL
Status: DISCONTINUED | OUTPATIENT
Start: 2019-05-29 | End: 2019-05-29

## 2019-05-28 RX ORDER — PREDNISONE 20 MG/1
TABLET ORAL
Qty: 15 TABLET | Refills: 0 | Status: SHIPPED | OUTPATIENT
Start: 2019-05-28 | End: 2019-06-20 | Stop reason: ALTCHOICE

## 2019-05-28 RX ORDER — IPRATROPIUM BROMIDE AND ALBUTEROL SULFATE 2.5; .5 MG/3ML; MG/3ML
3 SOLUTION RESPIRATORY (INHALATION)
Status: DISCONTINUED | OUTPATIENT
Start: 2019-05-28 | End: 2019-05-29

## 2019-05-28 NOTE — PLAN OF CARE
Problem: Patient/Family Goals  Goal: Patient/Family Long Term Goal  Description  Patient's Long Term Goal: Discharge home    Interventions:  - See additional Care Plan goals for specific interventions  Outcome: Progressing  Goal: Patient/Family Short Ter providers present:    Mobility Goal:    Readmission Risk:     [] Low     [x] Medium     [] High    Active issue(s) requiring resolution prior to discharge:tolerate changing to po steroids today.     Anticipated discharge date: potentially today per Dr. Irvin Del Valle

## 2019-05-28 NOTE — DISCHARGE SUMMARY
** note should be labelled PROGRESS NOTE                                                     General Medicine Discharge Summary     Patient ID:  Josue Luis  34year old  7/3/1990    Admit date: 5/27/2019    Discharge date and time:  5/29/19    Attending Activity: activity as tolerated  Diet: regular diet  Wound Care: as directed  Code Status: Full Code      Exam on day of discharge:      05/28/19  0452   BP: 128/78   Pulse: 85   Resp: 20   Temp: 98.2 °F (36.8 °C)       General: no acute distress, al

## 2019-05-28 NOTE — PAYOR COMM NOTE
--------------  ADMISSION REVIEW     Payor: Zohreh Addison [facility] #:  [id number]  Authorization Number: [id number]      ED Provider Notes      Patient Seen in: [facility] Emergency Department    History   Patient presents wit well-nourished. HENT:   Head: Normocephalic and atraumatic. Eyes: Pupils are equal, round, and reactive to light. EOM are normal.   Neck: Normal range of motion. Neck supple. Cardiovascular: Normal rate and regular rhythm.    Pulmonary/Chest: Effort n cough, wheezing; pt with acute asthma exacerbation  Steroid IV, Nebs ordered  Follows with pulm as outpatient, consult  Given pt's brother was recently sick check respiratory flu panel    Preventative heparin                5/27 RESP THERAPY  Received pt o

## 2019-05-28 NOTE — PROGRESS NOTES
[facility] Patient Status:  Inpatient    7/3/1990 MRN [id number]   [facility] 5NW-A Attending Janette Prabhakar,*   Hosp Day # 1 PCP Daljit Smith MD     SUBJECTIVE: Pt feels much improved this morning.   [facility] Imaging: I have independently visualized all relevant chest imaging in PACS. I agree with the radiology interpretation except where noted. ASSESSMENT/PLAN:  1.  Asthma Exacerbation  -severe persistent at baseline  -RVP + rhino/enterovirus, CXR withou

## 2019-05-28 NOTE — PROGRESS NOTES
Pt. Started on po steroids per pulm. Pt. Up and ambulated in hallway. Pt,. States he is feeling better but still tight and not well enough to go home today. Dr. Aruna Taylor and Dr. Les Rayo notified. Discharge cancelled until tomorrow.  Will continued to [facility]

## 2019-05-28 NOTE — CM/SW NOTE
05/28/19 1400   CM/SW Screening   Referral Source    Information Source Chart review;Nursing rounds   Patient's Mental Status Alert;Oriented   Patient lives with   (parents)   Patient Status Prior to Admission   Independent with ADLs and Mob

## 2019-05-28 NOTE — RESPIRATORY THERAPY NOTE
Received pt on RA. Saturating at 100%. Peak flow predicted value is 600. He has been getting up to 450 post treatment. BS are clear. Continue duoneb treatments as ordered.

## 2019-05-28 NOTE — PLAN OF CARE
PROBLEM: Asthma exac. +Rhino/Ent.    ASSESS: Pt. AOx4, no c/o pain or discomfort this shift. Lungs very diminished, expiratory wheezes. Pt. Has slight cough, reports clear sputum. Up ad isabel. Voids freely. VSS. ACTION: Medications per orders.  Reinaldo

## 2019-05-29 VITALS
HEART RATE: 88 BPM | WEIGHT: 254.5 LBS | BODY MASS INDEX: 34.47 KG/M2 | SYSTOLIC BLOOD PRESSURE: 123 MMHG | HEIGHT: 72 IN | DIASTOLIC BLOOD PRESSURE: 74 MMHG | OXYGEN SATURATION: 100 % | RESPIRATION RATE: 20 BRPM | TEMPERATURE: 98 F

## 2019-05-29 PROCEDURE — 94640 AIRWAY INHALATION TREATMENT: CPT

## 2019-05-29 PROCEDURE — 94664 DEMO&/EVAL PT USE INHALER: CPT

## 2019-05-29 RX ORDER — ALBUTEROL SULFATE 2.5 MG/3ML
2.5 SOLUTION RESPIRATORY (INHALATION) EVERY 4 HOURS PRN
Qty: 150 ML | Refills: 1 | Status: SHIPPED | OUTPATIENT
Start: 2019-05-29 | End: 2019-12-25

## 2019-05-29 RX ORDER — BENZONATATE 100 MG/1
100 CAPSULE ORAL 3 TIMES DAILY PRN
Qty: 30 CAPSULE | Refills: 0 | Status: SHIPPED | OUTPATIENT
Start: 2019-05-29 | End: 2019-06-06 | Stop reason: ALTCHOICE

## 2019-05-29 RX ORDER — ALBUTEROL SULFATE 90 UG/1
2 AEROSOL, METERED RESPIRATORY (INHALATION) EVERY 4 HOURS PRN
Qty: 1 INHALER | Refills: 0 | Status: SHIPPED | OUTPATIENT
Start: 2019-05-29 | End: 2019-06-06

## 2019-05-29 RX ORDER — GUAIFENESIN 600 MG
600 TABLET, EXTENDED RELEASE 12 HR ORAL 2 TIMES DAILY
Qty: 14 TABLET | Refills: 0 | Status: SHIPPED | OUTPATIENT
Start: 2019-05-29 | End: 2019-06-06 | Stop reason: ALTCHOICE

## 2019-05-29 NOTE — PLAN OF CARE
Problem: Patient/Family Goals  Goal: Patient/Family Long Term Goal  Description  Patient's Long Term Goal: DC home    Interventions:  - comply with plan of care  - See additional Care Plan goals for specific interventions   5/29/2019 1047 by Thea Manual, preferred to walk. Pts mother at bedside to take him home.

## 2019-05-29 NOTE — PLAN OF CARE
Problem: Patient/Family Goals  Goal: Patient/Family Long Term Goal  Description  Patient's Long Term Goal: DC home    Interventions:  - comply with plan of care  - See additional Care Plan goals for specific interventions   Outcome: Progressing  Goal: Pa

## 2019-05-29 NOTE — DISCHARGE SUMMARY
General Medicine Discharge Summary     Patient ID:  Yolanda Yates  34year old  7/3/1990    Admit date: 5/27/2019    Discharge date and time:  5/28/19    Attending Physician: Nimesh Perez discharge:      05/29/19  0510   BP: 123/74   Pulse: 88   Resp: 20   Temp: 97.7 °F (36.5 °C)       General: no acute distress, alert and oriented x 3  Heart: RRR  Lungs: clear, no wheezing  Abdomen: nontender, nondistended, intact BS  Extremities: no pedal

## 2019-05-29 NOTE — PLAN OF CARE
Problem: asthma exacerbation, bronchitis  Data: Patient alert and oriented overnight, on room air maintaining saturation >93%. Patient denies cough at this time, up in room as tolerated, voiding freely, denies pain. Vital signs stable.    Action: Due medica

## 2019-05-29 NOTE — PROGRESS NOTES
Pulmonary Progress Note        NAME: Rosalind Aguilar - ROOM: 532/532-A - MRN: [id number] - Age: 34year old - : 7/3/1990        Last 24hrs: No events overnight, ready to go home    OBJECTIVE:   19  1222 19  2214 19  0510 19  5956 TROPI    Albumin   Date/Time Value Ref Range Status   10/24/2017 09:49 PM 4.1 3.5 - 4.8 g/dL Final         ASSESSMENT/PLAN:  1.  Asthma Exacerbation  -severe persistent at baseline  -RVP + rhino/enterovirus, CXR without infiltrate  -singulair  -cont PO ster

## 2019-05-29 NOTE — RESPIRATORY THERAPY NOTE
Received  Pt on room air. Nebs changed to QID pt notified and agrees with order change. BS are clear. Peakflows show improvement after neb. Continue to monitor.

## 2019-08-29 PROBLEM — J20.9 ACUTE BRONCHITIS: Status: RESOLVED | Noted: 2019-05-27 | Resolved: 2019-08-29

## 2019-08-29 PROBLEM — J06.9 UPPER RESPIRATORY TRACT INFECTION, UNSPECIFIED TYPE: Status: RESOLVED | Noted: 2019-05-27 | Resolved: 2019-08-29

## 2019-08-29 PROBLEM — J20.9 ACUTE BRONCHITIS, UNSPECIFIED ORGANISM: Status: RESOLVED | Noted: 2019-05-27 | Resolved: 2019-08-29

## 2019-12-31 ENCOUNTER — HOSPITAL ENCOUNTER (INPATIENT)
Facility: HOSPITAL | Age: 29
LOS: 2 days | Discharge: HOME | DRG: 202 | End: 2020-01-03
Attending: EMERGENCY MEDICINE | Admitting: INTERNAL MEDICINE

## 2019-12-31 ENCOUNTER — APPOINTMENT (OUTPATIENT)
Dept: GENERAL RADIOLOGY | Facility: HOSPITAL | Age: 29
DRG: 202 | End: 2019-12-31
Attending: EMERGENCY MEDICINE

## 2019-12-31 DIAGNOSIS — J45.51 SEVERE PERSISTENT ASTHMA WITH EXACERBATION: Primary | ICD-10-CM

## 2019-12-31 LAB
ALBUMIN SERPL-MCNC: 3.9 G/DL (ref 3.4–5)
ALBUMIN/GLOB SERPL: 1 {RATIO} (ref 1–2)
ALP LIVER SERPL-CCNC: 62 U/L (ref 45–117)
ALT SERPL-CCNC: 40 U/L (ref 16–61)
ANION GAP SERPL CALC-SCNC: 5 MMOL/L (ref 0–18)
BASOPHILS # BLD AUTO: 0.03 X10(3) UL (ref 0–0.2)
BASOPHILS NFR BLD AUTO: 0.3 %
BILIRUB SERPL-MCNC: 0.5 MG/DL (ref 0.1–2)
BUN BLD-MCNC: 9 MG/DL (ref 7–18)
BUN/CREAT SERPL: 8.5 (ref 10–20)
CALCIUM BLD-MCNC: 8.4 MG/DL (ref 8.5–10.1)
CHLORIDE SERPL-SCNC: 110 MMOL/L (ref 98–112)
CO2 SERPL-SCNC: 24 MMOL/L (ref 21–32)
CREAT BLD-MCNC: 1.06 MG/DL (ref 0.7–1.3)
DEPRECATED RDW RBC AUTO: 41.4 FL (ref 35.1–46.3)
EOSINOPHIL # BLD AUTO: 0.6 X10(3) UL (ref 0–0.7)
EOSINOPHIL NFR BLD AUTO: 6.7 %
ERYTHROCYTE [DISTWIDTH] IN BLOOD BY AUTOMATED COUNT: 13.6 % (ref 11–15)
GLOBULIN PLAS-MCNC: 3.9 G/DL (ref 2.8–4.4)
GLUCOSE BLD-MCNC: 87 MG/DL (ref 70–99)
HCT VFR BLD AUTO: 50.1 % (ref 39–53)
HGB BLD-MCNC: 16.4 G/DL (ref 13–17.5)
IMM GRANULOCYTES # BLD AUTO: 0.02 X10(3) UL (ref 0–1)
IMM GRANULOCYTES NFR BLD: 0.2 %
LYMPHOCYTES # BLD AUTO: 2.16 X10(3) UL (ref 1–4)
LYMPHOCYTES NFR BLD AUTO: 24 %
M PROTEIN MFR SERPL ELPH: 7.8 G/DL (ref 6.4–8.2)
MCH RBC QN AUTO: 27.3 PG (ref 26–34)
MCHC RBC AUTO-ENTMCNC: 32.7 G/DL (ref 31–37)
MCV RBC AUTO: 83.5 FL (ref 80–100)
MONOCYTES # BLD AUTO: 0.67 X10(3) UL (ref 0.1–1)
MONOCYTES NFR BLD AUTO: 7.5 %
NEUTROPHILS # BLD AUTO: 5.51 X10 (3) UL (ref 1.5–7.7)
NEUTROPHILS # BLD AUTO: 5.51 X10(3) UL (ref 1.5–7.7)
NEUTROPHILS NFR BLD AUTO: 61.3 %
OSMOLALITY SERPL CALC.SUM OF ELEC: 286 MOSM/KG (ref 275–295)
PLATELET # BLD AUTO: 309 10(3)UL (ref 150–450)
RBC # BLD AUTO: 6 X10(6)UL (ref 4.3–5.7)
SODIUM SERPL-SCNC: 139 MMOL/L (ref 136–145)
WBC # BLD AUTO: 9 X10(3) UL (ref 4–11)

## 2019-12-31 PROCEDURE — 85025 COMPLETE CBC W/AUTO DIFF WBC: CPT | Performed by: EMERGENCY MEDICINE

## 2019-12-31 PROCEDURE — 99291 CRITICAL CARE FIRST HOUR: CPT

## 2019-12-31 PROCEDURE — 87581 M.PNEUMON DNA AMP PROBE: CPT | Performed by: EMERGENCY MEDICINE

## 2019-12-31 PROCEDURE — 80053 COMPREHEN METABOLIC PANEL: CPT | Performed by: EMERGENCY MEDICINE

## 2019-12-31 PROCEDURE — 96374 THER/PROPH/DIAG INJ IV PUSH: CPT

## 2019-12-31 PROCEDURE — 94644 CONT INHLJ TX 1ST HOUR: CPT

## 2019-12-31 PROCEDURE — 99285 EMERGENCY DEPT VISIT HI MDM: CPT

## 2019-12-31 PROCEDURE — 87633 RESP VIRUS 12-25 TARGETS: CPT | Performed by: EMERGENCY MEDICINE

## 2019-12-31 PROCEDURE — 94645 CONT INHLJ TX EACH ADDL HOUR: CPT

## 2019-12-31 PROCEDURE — 87486 CHLMYD PNEUM DNA AMP PROBE: CPT | Performed by: EMERGENCY MEDICINE

## 2019-12-31 PROCEDURE — 87999 UNLISTED MICROBIOLOGY PX: CPT

## 2019-12-31 PROCEDURE — 87798 DETECT AGENT NOS DNA AMP: CPT | Performed by: EMERGENCY MEDICINE

## 2019-12-31 PROCEDURE — 71045 X-RAY EXAM CHEST 1 VIEW: CPT | Performed by: EMERGENCY MEDICINE

## 2019-12-31 RX ORDER — METHYLPREDNISOLONE SODIUM SUCCINATE 125 MG/2ML
125 INJECTION, POWDER, LYOPHILIZED, FOR SOLUTION INTRAMUSCULAR; INTRAVENOUS ONCE
Status: COMPLETED | OUTPATIENT
Start: 2019-12-31 | End: 2019-12-31

## 2020-01-01 LAB
ADENOVIRUS PCR:: NEGATIVE
B PERT DNA SPEC QL NAA+PROBE: NEGATIVE
BILIRUB UR QL STRIP.AUTO: NEGATIVE
C PNEUM DNA SPEC QL NAA+PROBE: NEGATIVE
CLARITY UR REFRACT.AUTO: CLEAR
COLOR UR AUTO: YELLOW
CORONAVIRUS 229E PCR:: NEGATIVE
CORONAVIRUS HKU1 PCR:: POSITIVE
CORONAVIRUS NL63 PCR:: NEGATIVE
CORONAVIRUS OC43 PCR:: NEGATIVE
FLUAV RNA SPEC QL NAA+PROBE: NEGATIVE
FLUBV RNA SPEC QL NAA+PROBE: NEGATIVE
GLUCOSE UR STRIP.AUTO-MCNC: NEGATIVE MG/DL
KETONES UR STRIP.AUTO-MCNC: 20 MG/DL
LEUKOCYTE ESTERASE UR QL STRIP.AUTO: NEGATIVE
METAPNEUMOVIRUS PCR:: NEGATIVE
MYCOPLASMA PNEUMONIA PCR:: NEGATIVE
NITRITE UR QL STRIP.AUTO: NEGATIVE
PARAINFLUENZA 1 PCR:: NEGATIVE
PARAINFLUENZA 2 PCR:: NEGATIVE
PARAINFLUENZA 3 PCR:: NEGATIVE
PARAINFLUENZA 4 PCR:: NEGATIVE
PH UR STRIP.AUTO: 5 [PH] (ref 4.5–8)
PROT UR STRIP.AUTO-MCNC: NEGATIVE MG/DL
RBC UR QL AUTO: NEGATIVE
RHINOVIRUS/ENTERO PCR:: NEGATIVE
RSV RNA SPEC QL NAA+PROBE: NEGATIVE
SP GR UR STRIP.AUTO: 1.02 (ref 1–1.03)
UROBILINOGEN UR STRIP.AUTO-MCNC: <2 MG/DL

## 2020-01-01 PROCEDURE — 87081 CULTURE SCREEN ONLY: CPT | Performed by: INTERNAL MEDICINE

## 2020-01-01 PROCEDURE — 94640 AIRWAY INHALATION TREATMENT: CPT

## 2020-01-01 PROCEDURE — 94644 CONT INHLJ TX 1ST HOUR: CPT

## 2020-01-01 PROCEDURE — 81001 URINALYSIS AUTO W/SCOPE: CPT | Performed by: INTERNAL MEDICINE

## 2020-01-01 RX ORDER — HEPARIN SODIUM 5000 [USP'U]/ML
5000 INJECTION, SOLUTION INTRAVENOUS; SUBCUTANEOUS EVERY 8 HOURS SCHEDULED
Status: DISCONTINUED | OUTPATIENT
Start: 2020-01-01 | End: 2020-01-03

## 2020-01-01 RX ORDER — IPRATROPIUM BROMIDE AND ALBUTEROL SULFATE 2.5; .5 MG/3ML; MG/3ML
3 SOLUTION RESPIRATORY (INHALATION)
Status: DISCONTINUED | OUTPATIENT
Start: 2020-01-01 | End: 2020-01-01

## 2020-01-01 RX ORDER — ACETAMINOPHEN 325 MG/1
650 TABLET ORAL EVERY 6 HOURS PRN
Status: DISCONTINUED | OUTPATIENT
Start: 2020-01-01 | End: 2020-01-03

## 2020-01-01 RX ORDER — METHYLPREDNISOLONE SODIUM SUCCINATE 125 MG/2ML
60 INJECTION, POWDER, LYOPHILIZED, FOR SOLUTION INTRAMUSCULAR; INTRAVENOUS EVERY 8 HOURS
Status: DISCONTINUED | OUTPATIENT
Start: 2020-01-01 | End: 2020-01-01

## 2020-01-01 RX ORDER — IPRATROPIUM BROMIDE AND ALBUTEROL SULFATE 2.5; .5 MG/3ML; MG/3ML
3 SOLUTION RESPIRATORY (INHALATION)
Status: DISCONTINUED | OUTPATIENT
Start: 2020-01-01 | End: 2020-01-02

## 2020-01-01 RX ORDER — ALPRAZOLAM 0.25 MG/1
0.25 TABLET ORAL 3 TIMES DAILY PRN
Status: DISCONTINUED | OUTPATIENT
Start: 2020-01-01 | End: 2020-01-03

## 2020-01-01 RX ORDER — MONTELUKAST SODIUM 10 MG/1
10 TABLET ORAL NIGHTLY
Status: DISCONTINUED | OUTPATIENT
Start: 2020-01-01 | End: 2020-01-03

## 2020-01-01 RX ORDER — ALBUTEROL SULFATE 2.5 MG/3ML
SOLUTION RESPIRATORY (INHALATION)
Status: COMPLETED
Start: 2020-01-01 | End: 2020-01-01

## 2020-01-01 RX ORDER — METHYLPREDNISOLONE SODIUM SUCCINATE 125 MG/2ML
80 INJECTION, POWDER, LYOPHILIZED, FOR SOLUTION INTRAMUSCULAR; INTRAVENOUS EVERY 8 HOURS
Status: DISCONTINUED | OUTPATIENT
Start: 2020-01-01 | End: 2020-01-03

## 2020-01-01 RX ORDER — CETIRIZINE HYDROCHLORIDE 10 MG/1
10 TABLET ORAL NIGHTLY
Status: DISCONTINUED | OUTPATIENT
Start: 2020-01-01 | End: 2020-01-03

## 2020-01-01 NOTE — ED PROVIDER NOTES
Patient Seen in: [facility] Emergency Department      History   Patient presents with:  Dyspnea FALLON SOB    Stated Complaint: Shortness of breath with wheezing, hx of asthma - using inhalers 5-6x today w/ *    HPI    Patient is a 79-year-old male com 95 %   O2 Device None (Room air)       Current:/78   Pulse 93   Temp 97.4 °F (36.3 °C) (Tympanic)   Resp 18   Wt 113.4 kg   SpO2 97%   BMI 33.91 kg/m²         Physical Exam    GENERAL: No acute distress, well appearing and non-toxic, Alert and orient or pneumonia. Normal heart size and vascularity. No pleural effusion.     Dictated by: Joseluis Tellez MD on 12/31/2019 at 21:27     Approved by: Joseluis Tellez MD on 12/31/2019 at 21:28            Medications   albuterol Sulfate (VENTOLIN) (5 MG/M with exacerbation J45.51 12/31/2019 Unknown

## 2020-01-01 NOTE — ED NOTES
Spoke with Kecia Kumar from ICU. RN is being called in to care for pt in ICU. Awaiting call back to transfer care of pt. Pt resting comfortably on cart with family at bedside.

## 2020-01-01 NOTE — RESPIRATORY THERAPY NOTE
Received patient from ER, received 3 continuous albuterol nebs. Upon arrival in unit, patient breath sounds expiratory wheezing, patient states his breathing is much better than when he first arrived.  Continuous neb stopped and duoneb Q2 ordered by Dr. Nathan Flank

## 2020-01-01 NOTE — RESPIRATORY THERAPY NOTE
RECEIVED PT ON NC 3L, SPO2 88%, RESPIRATIONS HIGH 20s-30s patient stating he can't breathe. 10MG ALB CONT NEB BEGAN RIGHT AFTER DUONEB TX. WILL ASSESS POST 1 HR TREATMENT. COULD POTENTIALLY WEAN HIM TO Q2-Q3 NEBS POST 1 HR, BUT WILL RE-ASSESS.

## 2020-01-01 NOTE — PLAN OF CARE
Patient arrived from ED at approximately [facility]. Received on continuous nebs. Alert and oriented X4-see flow sheets for further assessment. Yemi notified of new consult. Patient switched to nebs q2 hours. Lung sounds improved through the night.  Patient

## 2020-01-01 NOTE — CONSULTS
[facility] PULMONARY/CRITICAL CARE CONSULTATION       HPI: Austin Andrade is a 34year old male with a history of severe eosinophilic asthma who presented to the ER last night with c/o shortness of breath and cough. Symptoms started 3-4 days ago.  Multiple family m mepolizumab (NUCALA) 100 MG Subcutaneous Recon Soln More than a month at Unknown time  Yes No   Sig: Inject 100 mg into the skin every 28 days.       Facility-Administered Medications: None       CURRENT MEDICATIONS      methylPREDNISolone Sodium Succ (Solu Active member of club or organization: Not on file        Attends meetings of clubs or organizations: Not on file        Relationship status: Not on file      Intimate partner violence:        Fear of current or ex partner: Not on file        Emotion Oximetry Probe Site Changed: No  Pulse Ox Probe Location: Left hand    Gen - Alert, oriented x 3, in no apparent distress  HEENT - head normocephalic, atraumatic.  Eyes-no scleral icterus or injection              - Mouth - moist mucus membranes, no oral le

## 2020-01-01 NOTE — PLAN OF CARE
Dr. Saintclair Raker updated that patient wheezing and c/o SOB 2.5-3 hours after nebulizer treatment and that RT had to give him an hour long nebulizer; currently on 4L NC. Transfer orders to floor canceled.    Venturi mask placed per patient request as nasal cannula

## 2020-01-02 LAB
ANION GAP SERPL CALC-SCNC: 9 MMOL/L (ref 0–18)
BUN BLD-MCNC: 13 MG/DL (ref 7–18)
BUN/CREAT SERPL: 14.4 (ref 10–20)
CALCIUM BLD-MCNC: 9.4 MG/DL (ref 8.5–10.1)
CHLORIDE SERPL-SCNC: 106 MMOL/L (ref 98–112)
CO2 SERPL-SCNC: 24 MMOL/L (ref 21–32)
CREAT BLD-MCNC: 0.9 MG/DL (ref 0.7–1.3)
DEPRECATED RDW RBC AUTO: 41.8 FL (ref 35.1–46.3)
ERYTHROCYTE [DISTWIDTH] IN BLOOD BY AUTOMATED COUNT: 14.2 % (ref 11–15)
GLUCOSE BLD-MCNC: 150 MG/DL (ref 70–99)
HCT VFR BLD AUTO: 47.3 % (ref 39–53)
HGB BLD-MCNC: 15.4 G/DL (ref 13–17.5)
MCH RBC QN AUTO: 26.8 PG (ref 26–34)
MCHC RBC AUTO-ENTMCNC: 32.6 G/DL (ref 31–37)
MCV RBC AUTO: 82.3 FL (ref 80–100)
OSMOLALITY SERPL CALC.SUM OF ELEC: 291 MOSM/KG (ref 275–295)
PLATELET # BLD AUTO: 344 10(3)UL (ref 150–450)
POTASSIUM SERPL-SCNC: 4.2 MMOL/L (ref 3.5–5.1)
RBC # BLD AUTO: 5.75 X10(6)UL (ref 4.3–5.7)
SODIUM SERPL-SCNC: 139 MMOL/L (ref 136–145)
WBC # BLD AUTO: 16.5 X10(3) UL (ref 4–11)

## 2020-01-02 PROCEDURE — 80048 BASIC METABOLIC PNL TOTAL CA: CPT | Performed by: NURSE PRACTITIONER

## 2020-01-02 PROCEDURE — 85027 COMPLETE CBC AUTOMATED: CPT | Performed by: NURSE PRACTITIONER

## 2020-01-02 PROCEDURE — 94640 AIRWAY INHALATION TREATMENT: CPT

## 2020-01-02 RX ORDER — IPRATROPIUM BROMIDE AND ALBUTEROL SULFATE 2.5; .5 MG/3ML; MG/3ML
3 SOLUTION RESPIRATORY (INHALATION)
Status: DISCONTINUED | OUTPATIENT
Start: 2020-01-02 | End: 2020-01-03

## 2020-01-02 RX ORDER — ONDANSETRON 2 MG/ML
4 INJECTION INTRAMUSCULAR; INTRAVENOUS EVERY 6 HOURS PRN
Status: DISCONTINUED | OUTPATIENT
Start: 2020-01-02 | End: 2020-01-03

## 2020-01-02 RX ORDER — ONDANSETRON 2 MG/ML
INJECTION INTRAMUSCULAR; INTRAVENOUS
Status: DISPENSED
Start: 2020-01-02 | End: 2020-01-02

## 2020-01-02 RX ORDER — ONDANSETRON 4 MG/1
4 TABLET, ORALLY DISINTEGRATING ORAL EVERY 6 HOURS PRN
Status: DISCONTINUED | OUTPATIENT
Start: 2020-01-02 | End: 2020-01-03

## 2020-01-02 NOTE — H&P
[facility] Hospitalist H&P       CC: Patient presents with:  Dyspnea FALLON SOB       PCP: Michael Smith MD    History of Present Illness:  27yo M with h/o eosinophilic asthma presented to ED for evaluation of increased SOB, cough, nasal congestion x 2-3 days. tobacco: Never Used    Alcohol use:  Yes      Alcohol/week: 0.0 - 2.0 standard drinks      Frequency: Monthly or less      Drinks per session: 1 or 2      Comment: socially       Fam Hx  Family History   Problem Relation Age of Onset   • Cancer Paternal Gra 12/31/2019 at 21:28           CXR independently reviewed - no PNA      ASSESSMENT / PLAN:     27yo M with h/o eosinophilic asthma presented to ED for evaluation of increased SOB, cough x 3 days. Using nebs without significant relief. +sick contacts.  Admitt

## 2020-01-02 NOTE — PLAN OF CARE
Received patient this am awake and alert. Denies pain. States breathing feels better. RA-sats 92%. Lungs sound dim throughout. Tolerating nebs q4h. Ambulating in room w/o difficulty. Ok to transfer to [facility]. Awaiting bed for transfer.

## 2020-01-02 NOTE — PROGRESS NOTES
Critical Care Progress Note     Assessment / Plan:  1.  Acute hypoxemic respiratory failure - secondary to acute asthma exacerbation triggered by coronavirus infection  - on RA  - IV steroids  - duonebs q2h currently  - breo in lieu of advair  - incshania in

## 2020-01-02 NOTE — RESPIRATORY THERAPY NOTE
Peak flow  pre tx=    450    Post tx=  470      Predicted:   600       Effort:   good            Attempts:3

## 2020-01-02 NOTE — PLAN OF CARE
Pt on venti mask with no signs of respiratory distress. Family in room, pt updated on plan of care. Neb treatments  Q2 hrs as ordered. Pt states that he's feeling better. Prefers the Venti mask to n/c. No complaints at this time.

## 2020-01-02 NOTE — CM/SW NOTE
01/02/20 1100   CM/SW Screening   Referral Source    Information Source Chart review;Nursing rounds   Patient's Mental Status Alert;Oriented   Patient lives with   (mother)   Patient Status Prior to Admission   Independent with ADLs and Mobi

## 2020-01-02 NOTE — CM/SW NOTE
Received call from [facility] [address], patient is not eligible for emergency Medicaid d/t being over income level.

## 2020-01-03 VITALS
OXYGEN SATURATION: 94 % | HEART RATE: 85 BPM | TEMPERATURE: 98 F | WEIGHT: 240.94 LBS | SYSTOLIC BLOOD PRESSURE: 138 MMHG | BODY MASS INDEX: 33 KG/M2 | RESPIRATION RATE: 12 BRPM | DIASTOLIC BLOOD PRESSURE: 83 MMHG

## 2020-01-03 LAB
ANION GAP SERPL CALC-SCNC: 10 MMOL/L (ref 0–18)
BUN BLD-MCNC: 15 MG/DL (ref 7–18)
BUN/CREAT SERPL: 15.3 (ref 10–20)
CALCIUM BLD-MCNC: 8.8 MG/DL (ref 8.5–10.1)
CHLORIDE SERPL-SCNC: 102 MMOL/L (ref 98–112)
CO2 SERPL-SCNC: 26 MMOL/L (ref 21–32)
CREAT BLD-MCNC: 0.98 MG/DL (ref 0.7–1.3)
DEPRECATED RDW RBC AUTO: 41.2 FL (ref 35.1–46.3)
ERYTHROCYTE [DISTWIDTH] IN BLOOD BY AUTOMATED COUNT: 14 % (ref 11–15)
GLUCOSE BLD-MCNC: 146 MG/DL (ref 70–99)
HCT VFR BLD AUTO: 47.1 % (ref 39–53)
HGB BLD-MCNC: 15.6 G/DL (ref 13–17.5)
MCH RBC QN AUTO: 27.2 PG (ref 26–34)
MCHC RBC AUTO-ENTMCNC: 33.1 G/DL (ref 31–37)
MCV RBC AUTO: 82.2 FL (ref 80–100)
OSMOLALITY SERPL CALC.SUM OF ELEC: 289 MOSM/KG (ref 275–295)
PLATELET # BLD AUTO: 342 10(3)UL (ref 150–450)
POTASSIUM SERPL-SCNC: 4 MMOL/L (ref 3.5–5.1)
RBC # BLD AUTO: 5.73 X10(6)UL (ref 4.3–5.7)
SODIUM SERPL-SCNC: 138 MMOL/L (ref 136–145)
WBC # BLD AUTO: 19.6 X10(3) UL (ref 4–11)

## 2020-01-03 PROCEDURE — 80048 BASIC METABOLIC PNL TOTAL CA: CPT | Performed by: NURSE PRACTITIONER

## 2020-01-03 PROCEDURE — 94640 AIRWAY INHALATION TREATMENT: CPT

## 2020-01-03 PROCEDURE — 85027 COMPLETE CBC AUTOMATED: CPT | Performed by: NURSE PRACTITIONER

## 2020-01-03 RX ORDER — PREDNISONE 20 MG/1
TABLET ORAL
Qty: 30 TABLET | Refills: 0 | Status: SHIPPED | OUTPATIENT
Start: 2020-01-03 | End: 2020-05-26 | Stop reason: ALTCHOICE

## 2020-01-03 RX ORDER — ALBUTEROL SULFATE 2.5 MG/3ML
2.5 SOLUTION RESPIRATORY (INHALATION) EVERY 6 HOURS PRN
Qty: 50 VIAL | Refills: 1 | Status: SHIPPED | OUTPATIENT
Start: 2020-01-03 | End: 2020-01-17

## 2020-01-03 RX ORDER — PREDNISONE 20 MG/1
40 TABLET ORAL
Status: DISCONTINUED | OUTPATIENT
Start: 2020-01-04 | End: 2020-01-03

## 2020-01-03 NOTE — PLAN OF CARE
Received pt alert, oriented, able to follow commands. Able to ambulate to bathroom independently. On room air with diminished breath sounds. Afebrile. Blood pressure stable. Normal sinus rhythm/sinus tachycardia. Refusing SCD's for DVT prophylaxis.  Bryant Dance

## 2020-01-03 NOTE — PLAN OF CARE
Patient's discharge instructions given, read back and teach back done. Awaiting for family for .  1300: Patient discharge on stable condition. Wheelchair. Family with patient.

## 2020-01-03 NOTE — CM/SW NOTE
01/03/20 1200   Discharge disposition   Expected discharge disposition Home or Self   Discharge transportation Private car

## 2020-01-03 NOTE — RESPIRATORY THERAPY NOTE
Received pt on RA. BS diminished. Bronchodilator Neb Tx Q4. Peak flow pro/post 500/550. Will continue monitor pt.

## 2020-01-03 NOTE — PROGRESS NOTES
[facility] Patient Status:  Inpatient    7/3/1990 MRN [id number]   [facility] 4SW-A Attending Lolis Molina MD   Hosp Day # 2 PCP Puja Ritchie MD     Pulm / Critical Care Progress Note     S: pt states that he fe --  4.2 4.0    106 102   CO2 24.0 24.0 26.0   BUN 9 13 15     Creatinine (mg/dL)   Date Value   01/03/2020 0.98   01/02/2020 0.90   12/31/2019 1.06   08/29/2019 1.03   ]    Recent Labs   Lab 12/31/19  2123   ALT 40          ASSESSMENT/PLAN:    1.  Acu

## 2020-01-03 NOTE — DISCHARGE SUMMARY
General Medicine Discharge Summary     Patient ID:  Maria Del Rosario Sanchez  34year old  7/3/1990    Admit date: 12/31/2019    Discharge date and time: 1/3/2020    Attending Physician: Stephen Law MD     Primary today w/ no relief. On control medication  PATIENT STATED HISTORY: (As transcribed by Technologist)  Patient is here with shortness of breath with wheezing. Hx of Asthma. FINDINGS:      CONCLUSION:  Minimal bronchitis. No mass or pneumonia.   Normal hea

## 2020-01-03 NOTE — PLAN OF CARE
Problem: Patient/Family Goals  Goal: Patient/Family Long Term Goal  Description  Patient's Long Term Goal: Out of ICU    Interventions:  - nebs q2  -steroids  -return to normal respiratory function   - See additional Care Plan goals for specific interven the hallway. Patient was allowed to have shower. To plan discharge instructions today.

## 2020-01-03 NOTE — CM/SW NOTE
CM met with patient and mother at bedside. Pt stating he does have insurance which became active on 1/1/2020. [facility] given and explained. Pt is independent, working and driving. No needs identified at this time.     Radha Mcqueen, ELLEN RN, CTL  P: [phone]

## 2020-01-03 NOTE — PROGRESS NOTES
[facility] Hospitalist Progress Note     Doyle Davis Patient Status:  Inpatient    7/3/1990 MRN [id number]   [facility] 4SW-A Attending Kenney Walker MD   Hosp Day # 1 PCP Max Smith MD     CC: follow up    SUBJECTIVE:  Pt in good spir    # acute asthma exacerbation, hypoxia  - h/o eosinophilic asthma  - RVP +coronavirus  - nebs, IV steroids  - wean O2 as able  - apprec pulm recs     # anxiety  - xanax PRN     Prophy: hep sq    Dispo: ICU    Questions/concerns were discussed with esteban

## 2020-09-05 ENCOUNTER — HOSPITAL ENCOUNTER (OUTPATIENT)
Facility: HOSPITAL | Age: 30
Setting detail: OBSERVATION
Discharge: HOME | End: 2020-09-07
Attending: EMERGENCY MEDICINE | Admitting: INTERNAL MEDICINE
Payer: OTHER GOVERNMENT

## 2020-09-05 DIAGNOSIS — T78.40XS ALLERGY, SEQUELA: ICD-10-CM

## 2020-09-05 DIAGNOSIS — J45.52 SEVERE PERSISTENT ASTHMA WITH STATUS ASTHMATICUS: Primary | ICD-10-CM

## 2020-09-05 DIAGNOSIS — R06.03 RESPIRATORY DISTRESS: ICD-10-CM

## 2020-09-05 LAB
ALBUMIN SERPL-MCNC: 4.1 G/DL (ref 3.4–5)
ALBUMIN/GLOB SERPL: 1.2 {RATIO} (ref 1–2)
ALP LIVER SERPL-CCNC: 68 U/L (ref 45–117)
ALT SERPL-CCNC: 29 U/L (ref 16–61)
ANION GAP SERPL CALC-SCNC: 3 MMOL/L (ref 0–18)
AST SERPL-CCNC: 20 U/L (ref 15–37)
BASOPHILS # BLD AUTO: 0.03 X10(3) UL (ref 0–0.2)
BASOPHILS NFR BLD AUTO: 0.3 %
BILIRUB SERPL-MCNC: 0.5 MG/DL (ref 0.1–2)
BUN BLD-MCNC: 11 MG/DL (ref 7–18)
BUN/CREAT SERPL: 11 (ref 10–20)
CALCIUM BLD-MCNC: 9.2 MG/DL (ref 8.5–10.1)
CHLORIDE SERPL-SCNC: 110 MMOL/L (ref 98–112)
CO2 SERPL-SCNC: 27 MMOL/L (ref 21–32)
CREAT BLD-MCNC: 1 MG/DL (ref 0.7–1.3)
DEPRECATED RDW RBC AUTO: 40.4 FL (ref 35.1–46.3)
EOSINOPHIL # BLD AUTO: 0.66 X10(3) UL (ref 0–0.7)
EOSINOPHIL NFR BLD AUTO: 6.5 %
ERYTHROCYTE [DISTWIDTH] IN BLOOD BY AUTOMATED COUNT: 13.2 % (ref 11–15)
GLOBULIN PLAS-MCNC: 3.4 G/DL (ref 2.8–4.4)
GLUCOSE BLD-MCNC: 105 MG/DL (ref 70–99)
HCT VFR BLD AUTO: 45.6 % (ref 39–53)
HGB BLD-MCNC: 14.7 G/DL (ref 13–17.5)
IMM GRANULOCYTES # BLD AUTO: 0.02 X10(3) UL (ref 0–1)
IMM GRANULOCYTES NFR BLD: 0.2 %
LYMPHOCYTES # BLD AUTO: 3.27 X10(3) UL (ref 1–4)
LYMPHOCYTES NFR BLD AUTO: 32 %
M PROTEIN MFR SERPL ELPH: 7.5 G/DL (ref 6.4–8.2)
MCH RBC QN AUTO: 27.1 PG (ref 26–34)
MCHC RBC AUTO-ENTMCNC: 32.2 G/DL (ref 31–37)
MCV RBC AUTO: 84 FL (ref 80–100)
MONOCYTES # BLD AUTO: 0.64 X10(3) UL (ref 0.1–1)
MONOCYTES NFR BLD AUTO: 6.3 %
NEUTROPHILS # BLD AUTO: 5.6 X10 (3) UL (ref 1.5–7.7)
NEUTROPHILS # BLD AUTO: 5.6 X10(3) UL (ref 1.5–7.7)
NEUTROPHILS NFR BLD AUTO: 54.7 %
OSMOLALITY SERPL CALC.SUM OF ELEC: 290 MOSM/KG (ref 275–295)
PLATELET # BLD AUTO: 313 10(3)UL (ref 150–450)
POTASSIUM SERPL-SCNC: 3.4 MMOL/L (ref 3.5–5.1)
RBC # BLD AUTO: 5.43 X10(6)UL (ref 4.3–5.7)
SARS-COV-2 RNA RESP QL NAA+PROBE: NOT DETECTED
SODIUM SERPL-SCNC: 140 MMOL/L (ref 136–145)
WBC # BLD AUTO: 10.2 X10(3) UL (ref 4–11)

## 2020-09-05 PROCEDURE — 99285 EMERGENCY DEPT VISIT HI MDM: CPT

## 2020-09-05 PROCEDURE — 94644 CONT INHLJ TX 1ST HOUR: CPT

## 2020-09-05 PROCEDURE — 85025 COMPLETE CBC W/AUTO DIFF WBC: CPT | Performed by: EMERGENCY MEDICINE

## 2020-09-05 PROCEDURE — 96374 THER/PROPH/DIAG INJ IV PUSH: CPT

## 2020-09-05 PROCEDURE — 80053 COMPREHEN METABOLIC PANEL: CPT | Performed by: EMERGENCY MEDICINE

## 2020-09-05 RX ORDER — ENOXAPARIN SODIUM 100 MG/ML
40 INJECTION SUBCUTANEOUS DAILY
Status: DISCONTINUED | OUTPATIENT
Start: 2020-09-06 | End: 2020-09-07

## 2020-09-05 RX ORDER — METOCLOPRAMIDE HYDROCHLORIDE 5 MG/ML
10 INJECTION INTRAMUSCULAR; INTRAVENOUS EVERY 8 HOURS PRN
Status: DISCONTINUED | OUTPATIENT
Start: 2020-09-05 | End: 2020-09-07

## 2020-09-05 RX ORDER — ONDANSETRON 2 MG/ML
4 INJECTION INTRAMUSCULAR; INTRAVENOUS EVERY 6 HOURS PRN
Status: DISCONTINUED | OUTPATIENT
Start: 2020-09-05 | End: 2020-09-07

## 2020-09-05 RX ORDER — POLYETHYLENE GLYCOL 3350 17 G/17G
17 POWDER, FOR SOLUTION ORAL DAILY PRN
Status: DISCONTINUED | OUTPATIENT
Start: 2020-09-05 | End: 2020-09-07

## 2020-09-05 RX ORDER — ACETAMINOPHEN 325 MG/1
650 TABLET ORAL EVERY 6 HOURS PRN
Status: DISCONTINUED | OUTPATIENT
Start: 2020-09-05 | End: 2020-09-07

## 2020-09-05 RX ORDER — SODIUM PHOSPHATE, DIBASIC AND SODIUM PHOSPHATE, MONOBASIC 7; 19 G/133ML; G/133ML
1 ENEMA RECTAL ONCE AS NEEDED
Status: DISCONTINUED | OUTPATIENT
Start: 2020-09-05 | End: 2020-09-07

## 2020-09-05 RX ORDER — METHYLPREDNISOLONE SODIUM SUCCINATE 125 MG/2ML
125 INJECTION, POWDER, LYOPHILIZED, FOR SOLUTION INTRAMUSCULAR; INTRAVENOUS ONCE
Status: COMPLETED | OUTPATIENT
Start: 2020-09-05 | End: 2020-09-05

## 2020-09-05 RX ORDER — BISACODYL 10 MG
10 SUPPOSITORY, RECTAL RECTAL
Status: DISCONTINUED | OUTPATIENT
Start: 2020-09-05 | End: 2020-09-07

## 2020-09-06 ENCOUNTER — APPOINTMENT (OUTPATIENT)
Dept: GENERAL RADIOLOGY | Facility: HOSPITAL | Age: 30
End: 2020-09-06
Attending: INTERNAL MEDICINE
Payer: OTHER GOVERNMENT

## 2020-09-06 LAB
ANION GAP SERPL CALC-SCNC: 4 MMOL/L (ref 0–18)
BUN BLD-MCNC: 11 MG/DL (ref 7–18)
BUN/CREAT SERPL: 10.6 (ref 10–20)
CALCIUM BLD-MCNC: 9.5 MG/DL (ref 8.5–10.1)
CHLORIDE SERPL-SCNC: 108 MMOL/L (ref 98–112)
CO2 SERPL-SCNC: 24 MMOL/L (ref 21–32)
CREAT BLD-MCNC: 1.04 MG/DL (ref 0.7–1.3)
GLUCOSE BLD-MCNC: 135 MG/DL (ref 70–99)
HAV IGM SER QL: 1.8 MG/DL (ref 1.6–2.6)
OSMOLALITY SERPL CALC.SUM OF ELEC: 283 MOSM/KG (ref 275–295)
POTASSIUM SERPL-SCNC: 4.3 MMOL/L (ref 3.5–5.1)
SODIUM SERPL-SCNC: 136 MMOL/L (ref 136–145)

## 2020-09-06 PROCEDURE — 71046 X-RAY EXAM CHEST 2 VIEWS: CPT | Performed by: INTERNAL MEDICINE

## 2020-09-06 PROCEDURE — 80048 BASIC METABOLIC PNL TOTAL CA: CPT | Performed by: HOSPITALIST

## 2020-09-06 PROCEDURE — 94640 AIRWAY INHALATION TREATMENT: CPT

## 2020-09-06 PROCEDURE — 83735 ASSAY OF MAGNESIUM: CPT | Performed by: HOSPITALIST

## 2020-09-06 RX ORDER — PREDNISONE 20 MG/1
40 TABLET ORAL
Status: DISCONTINUED | OUTPATIENT
Start: 2020-09-06 | End: 2020-09-07

## 2020-09-06 RX ORDER — ALBUTEROL SULFATE 2.5 MG/3ML
2.5 SOLUTION RESPIRATORY (INHALATION) EVERY 4 HOURS PRN
Status: DISCONTINUED | OUTPATIENT
Start: 2020-09-06 | End: 2020-09-07

## 2020-09-06 RX ORDER — MAGNESIUM OXIDE 400 MG (241.3 MG MAGNESIUM) TABLET
400 TABLET ONCE
Status: COMPLETED | OUTPATIENT
Start: 2020-09-06 | End: 2020-09-06

## 2020-09-06 RX ORDER — MONTELUKAST SODIUM 10 MG/1
10 TABLET ORAL NIGHTLY
Status: DISCONTINUED | OUTPATIENT
Start: 2020-09-06 | End: 2020-09-07

## 2020-09-06 RX ORDER — ALBUTEROL SULFATE 2.5 MG/3ML
2.5 SOLUTION RESPIRATORY (INHALATION)
Status: DISCONTINUED | OUTPATIENT
Start: 2020-09-06 | End: 2020-09-07

## 2020-09-06 NOTE — PLAN OF CARE
Received pt from ER after receiving a continuous neb Tx. Changed patient to Q4 neb treatments after PRN was given and breath sounds remained expiratory wheezes. Will wean as tolerated later.

## 2020-09-06 NOTE — ED PROVIDER NOTES
Patient Seen in: [facility] Emergency Department      History   Patient presents with:  Dyspnea FALLON SOB    Stated Complaint: asthma    HPI    This is a pleasant 28-year-old male with a history of asthma coming with complaints of asthma exacerbation. patient appears in respiratory distress HEENT exam is normal wheezing noted diffusely with poor air movement cardiovascular exam shows tachycardia abdomen soft and nontender extremity no calf tenderness or edema skin was nondiaphoretic back exam is normal distress    Disposition:  There is no disposition on file for this visit. There is no disposition time on file for this visit. Follow-up:  No follow-up provider specified.         Medications Prescribed:  Current Discharge Medication List

## 2020-09-06 NOTE — PROGRESS NOTES
NURSING ADMISSION NOTE      Patient admitted via Cart  Oriented to room. Safety precautions initiated. Bed in low position. Call light in reach. Pt admitted from ED. Alert and oriented x4. VSS, afebrile.  C/o SOB, per pt improving after breathing

## 2020-09-06 NOTE — H&P
[facility] Hospitalist H&P       CC: Patient presents with:  Dyspnea FALLON SOB       PCP: Jessie Smith MD    History of Present Illness:  Mr. Ghada Fox is a 30 yo male with PMH of allergic rhinitis and severe persistent eosinophilic asthma who presented to the Alcohol use:  Yes      Alcohol/week: 0.0 - 2.0 standard drinks      Frequency: Monthly or less      Drinks per session: 1 or 2      Comment: socially       Fam Hx  Family History   Problem Relation Age of Onset   • Cancer Paternal Grandfather         prosta Exacerbation  - Nebs scheduled and PRN  - COVID 19 testing negative  - IV steroids given  - Home Breo, Singulair  - Patient reported wasn't taking certrezine  - pulm c/s, patient follows as outpatient with pulm  - patient was hospitalized in ICU in January

## 2020-09-06 NOTE — CONSULTS
Pulmonary H&P/Consult     NAME: Armani Lyon - ROOM: [address] [place] [address] - MRN: [id number] - Age: 31year old - :  7/3/1990    Date of Admission: 2020  9:44 PM  Admission Diagnosis: Respiratory distress [R06.03]  Severe persistent asthma with status asthmatic Paternal Grandmother    Social History    Tobacco Use      Smoking status: Never Smoker      Smokeless tobacco: Never Used    Alcohol use:  Yes      Alcohol/week: 0.0 - 2.0 standard drinks      Frequency: Monthly or less      Drinks per session: 1 or 2 K 3.4* 4.3    108   CO2 27.0 24.0   ALKPHO 68  --    AST 20  --    ALT 29  --    BILT 0.5  --    TP 7.5  --      No results for input(s): BNP in the last 168 hours. No results for input(s): TROP, CK in the last 168 hours.   Imaging: I independently

## 2020-09-07 VITALS
SYSTOLIC BLOOD PRESSURE: 125 MMHG | HEART RATE: 74 BPM | TEMPERATURE: 98 F | WEIGHT: 240 LBS | OXYGEN SATURATION: 99 % | RESPIRATION RATE: 18 BRPM | BODY MASS INDEX: 32.51 KG/M2 | HEIGHT: 72 IN | DIASTOLIC BLOOD PRESSURE: 70 MMHG

## 2020-09-07 LAB — HAV IGM SER QL: 2 MG/DL (ref 1.6–2.6)

## 2020-09-07 PROCEDURE — 83735 ASSAY OF MAGNESIUM: CPT | Performed by: INTERNAL MEDICINE

## 2020-09-07 PROCEDURE — 94640 AIRWAY INHALATION TREATMENT: CPT

## 2020-09-07 RX ORDER — PREDNISONE 20 MG/1
40 TABLET ORAL
Qty: 10 TABLET | Refills: 0 | Status: SHIPPED | OUTPATIENT
Start: 2020-09-08 | End: 2020-09-13

## 2020-09-07 RX ORDER — FUROSEMIDE 10 MG/ML
20 INJECTION INTRAMUSCULAR; INTRAVENOUS ONCE
Status: DISCONTINUED | OUTPATIENT
Start: 2020-09-07 | End: 2020-09-07

## 2020-09-07 NOTE — DISCHARGE SUMMARY
General Medicine Discharge Summary     Patient ID:  Michael Epps  31year old  7/3/1990    Admit date: 9/5/2020    Discharge date and time: 9/7/20    Attending Physician: Racheal Cox MD     Primary Care Physician: Randee Lara MD     Reason for above.     Thank Pavel Welch MD

## 2020-09-07 NOTE — PROGRESS NOTES
[facility] Patient Status:  Observation    7/3/1990 MRN [id number]   [facility] 4NW-A Attending Trinity Cannon MD   Hosp Day # 0 PCP Bryson Smith MD     SUBJECTIVE: Pt feels well, wants to go home.       OBJECTIV bilaterally, no wheezes or crackles                           Chest wall: No tenderness or deformity.                           CUYAM: LIGTVVZ rate and rhythm, normal S1S2, no murmur.                          Abdomen: soft, non-tender, non-distended, positi

## 2020-09-07 NOTE — PLAN OF CARE
Pt alert and oriented x4. VSS, afebrile. Denies pain or SOB. SpO2 within normal limits on room air. O2 walk completed, pt sats remained 93-96% on room air. Call light within reach. Will continue to monitor.     Problem: RESPIRATORY - ADULT  Goal: Achieves o

## 2020-09-13 ENCOUNTER — APPOINTMENT (OUTPATIENT)
Dept: CT IMAGING | Facility: HOSPITAL | Age: 30
End: 2020-09-13
Attending: EMERGENCY MEDICINE

## 2020-09-13 ENCOUNTER — APPOINTMENT (OUTPATIENT)
Dept: GENERAL RADIOLOGY | Facility: HOSPITAL | Age: 30
End: 2020-09-13

## 2020-09-13 ENCOUNTER — HOSPITAL ENCOUNTER (EMERGENCY)
Facility: HOSPITAL | Age: 30
Discharge: HOME | End: 2020-09-13
Attending: EMERGENCY MEDICINE

## 2020-09-13 VITALS
HEIGHT: 72 IN | TEMPERATURE: 99 F | OXYGEN SATURATION: 99 % | WEIGHT: 240 LBS | DIASTOLIC BLOOD PRESSURE: 74 MMHG | RESPIRATION RATE: 19 BRPM | BODY MASS INDEX: 32.51 KG/M2 | HEART RATE: 94 BPM | SYSTOLIC BLOOD PRESSURE: 124 MMHG

## 2020-09-13 DIAGNOSIS — J45.41 MODERATE PERSISTENT ASTHMA WITH ACUTE EXACERBATION: Primary | ICD-10-CM

## 2020-09-13 LAB
ANION GAP SERPL CALC-SCNC: 8 MMOL/L (ref 0–18)
BASOPHILS # BLD AUTO: 0.03 X10(3) UL (ref 0–0.2)
BASOPHILS NFR BLD AUTO: 0.2 %
BUN BLD-MCNC: 20 MG/DL (ref 7–18)
BUN/CREAT SERPL: 20.6 (ref 10–20)
CALCIUM BLD-MCNC: 8.8 MG/DL (ref 8.5–10.1)
CHLORIDE SERPL-SCNC: 107 MMOL/L (ref 98–112)
CO2 SERPL-SCNC: 24 MMOL/L (ref 21–32)
CREAT BLD-MCNC: 0.97 MG/DL (ref 0.7–1.3)
D-DIMER: 0.52 UG/ML FEU (ref ?–0.5)
DEPRECATED RDW RBC AUTO: 40.7 FL (ref 35.1–46.3)
EOSINOPHIL # BLD AUTO: 0.05 X10(3) UL (ref 0–0.7)
EOSINOPHIL NFR BLD AUTO: 0.4 %
ERYTHROCYTE [DISTWIDTH] IN BLOOD BY AUTOMATED COUNT: 13.4 % (ref 11–15)
GLUCOSE BLD-MCNC: 100 MG/DL (ref 70–99)
HCT VFR BLD AUTO: 44.4 % (ref 39–53)
HGB BLD-MCNC: 14.4 G/DL (ref 13–17.5)
IMM GRANULOCYTES # BLD AUTO: 0.1 X10(3) UL (ref 0–1)
IMM GRANULOCYTES NFR BLD: 0.8 %
LYMPHOCYTES # BLD AUTO: 3.61 X10(3) UL (ref 1–4)
LYMPHOCYTES NFR BLD AUTO: 29.2 %
MCH RBC QN AUTO: 27.2 PG (ref 26–34)
MCHC RBC AUTO-ENTMCNC: 32.4 G/DL (ref 31–37)
MCV RBC AUTO: 83.8 FL (ref 80–100)
MONOCYTES # BLD AUTO: 0.86 X10(3) UL (ref 0.1–1)
MONOCYTES NFR BLD AUTO: 6.9 %
NEUTROPHILS # BLD AUTO: 7.73 X10 (3) UL (ref 1.5–7.7)
NEUTROPHILS # BLD AUTO: 7.73 X10(3) UL (ref 1.5–7.7)
NEUTROPHILS NFR BLD AUTO: 62.5 %
OSMOLALITY SERPL CALC.SUM OF ELEC: 291 MOSM/KG (ref 275–295)
PLATELET # BLD AUTO: 297 10(3)UL (ref 150–450)
POTASSIUM SERPL-SCNC: 3.1 MMOL/L (ref 3.5–5.1)
RBC # BLD AUTO: 5.3 X10(6)UL (ref 4.3–5.7)
SODIUM SERPL-SCNC: 139 MMOL/L (ref 136–145)
WBC # BLD AUTO: 12.4 X10(3) UL (ref 4–11)

## 2020-09-13 PROCEDURE — 96374 THER/PROPH/DIAG INJ IV PUSH: CPT

## 2020-09-13 PROCEDURE — 71045 X-RAY EXAM CHEST 1 VIEW: CPT | Performed by: EMERGENCY MEDICINE

## 2020-09-13 PROCEDURE — 99285 EMERGENCY DEPT VISIT HI MDM: CPT

## 2020-09-13 PROCEDURE — 85379 FIBRIN DEGRADATION QUANT: CPT | Performed by: EMERGENCY MEDICINE

## 2020-09-13 PROCEDURE — 71275 CT ANGIOGRAPHY CHEST: CPT | Performed by: EMERGENCY MEDICINE

## 2020-09-13 PROCEDURE — 71045 X-RAY EXAM CHEST 1 VIEW: CPT

## 2020-09-13 PROCEDURE — 85025 COMPLETE CBC W/AUTO DIFF WBC: CPT | Performed by: EMERGENCY MEDICINE

## 2020-09-13 PROCEDURE — 80048 BASIC METABOLIC PNL TOTAL CA: CPT | Performed by: EMERGENCY MEDICINE

## 2020-09-13 PROCEDURE — 96375 TX/PRO/DX INJ NEW DRUG ADDON: CPT

## 2020-09-13 PROCEDURE — 94640 AIRWAY INHALATION TREATMENT: CPT

## 2020-09-13 RX ORDER — METHYLPREDNISOLONE SODIUM SUCCINATE 125 MG/2ML
125 INJECTION, POWDER, LYOPHILIZED, FOR SOLUTION INTRAMUSCULAR; INTRAVENOUS ONCE
Status: COMPLETED | OUTPATIENT
Start: 2020-09-13 | End: 2020-09-13

## 2020-09-13 RX ORDER — POTASSIUM CHLORIDE 20 MEQ/1
40 TABLET, EXTENDED RELEASE ORAL ONCE
Status: COMPLETED | OUTPATIENT
Start: 2020-09-13 | End: 2020-09-13

## 2020-09-13 RX ORDER — PREDNISONE 20 MG/1
TABLET ORAL
Qty: 11 TABLET | Refills: 0 | Status: SHIPPED | OUTPATIENT
Start: 2020-09-13 | End: 2020-09-22

## 2020-09-13 RX ORDER — DIPHENHYDRAMINE HYDROCHLORIDE 50 MG/ML
25 INJECTION INTRAMUSCULAR; INTRAVENOUS ONCE
Status: COMPLETED | OUTPATIENT
Start: 2020-09-13 | End: 2020-09-13

## 2020-09-13 RX ORDER — ALBUTEROL SULFATE 90 UG/1
8 AEROSOL, METERED RESPIRATORY (INHALATION) 4 TIMES DAILY
Status: DISCONTINUED | OUTPATIENT
Start: 2020-09-13 | End: 2020-09-13

## 2020-09-13 NOTE — ED NOTES
Clarification on allergy from pt; did ingest shellfish at a restaurant where he did have a reaction but has since eaten shellfish without any reaction.

## 2020-09-13 NOTE — ED PROVIDER NOTES
Patient Seen in: [facility] Emergency Department      History   Patient presents with:  Dyspnea FALLON SOB    Stated Complaint: FALLON, asthma hx    HPI    Patient has a history of exercise-induced asthma. He recently admitted for an asthma exacerbation. Ht 182.9 cm (6')   Wt 108.9 kg   SpO2 100%   BMI 32.55 kg/m²         Physical Exam      Constitutional: Awake, alert, age appearing, non-toxic  Head: Normocephalic and atraumatic. Eyes: EOM are normal. Pupils are equal, round, and reactive to light. Patient was given Solu-Medrol and albuterol MDI -he was given 8 puffs in lieu of a continuous nebulizer treatment. Continuous nebs are being avoided in light of the pandemic. He improved significantly afterwards.       D-dimer is slightly eleva tablet Refills: 0    !! - Potential duplicate medications found. Please discuss with provider.

## 2020-09-13 NOTE — ED INITIAL ASSESSMENT (HPI)
Patient presents with c/o exercise-induced asthma attack this morning. Patient was hospitalized for the same last weekend.  Patient used his rescue inhaler with no relief, EMS administered 8 puffs MDI with spacer and patient states he's feeling better at th

## 2021-01-08 PROBLEM — E55.9 VITAMIN D DEFICIENCY: Status: ACTIVE | Noted: 2021-01-08

## 2021-08-16 ENCOUNTER — HOSPITAL ENCOUNTER (EMERGENCY)
Facility: HOSPITAL | Age: 31
Discharge: HOME | End: 2021-08-16
Attending: EMERGENCY MEDICINE
Payer: OTHER GOVERNMENT

## 2021-08-16 VITALS
HEIGHT: 72 IN | HEART RATE: 97 BPM | RESPIRATION RATE: 14 BRPM | DIASTOLIC BLOOD PRESSURE: 71 MMHG | TEMPERATURE: 100 F | SYSTOLIC BLOOD PRESSURE: 110 MMHG | OXYGEN SATURATION: 97 % | WEIGHT: 240 LBS | BODY MASS INDEX: 32.51 KG/M2

## 2021-08-16 DIAGNOSIS — U07.1 COVID-19: Primary | ICD-10-CM

## 2021-08-16 LAB
ALBUMIN SERPL-MCNC: 3.8 G/DL (ref 3.4–5)
ALBUMIN/GLOB SERPL: 0.8 {RATIO} (ref 1–2)
ALP LIVER SERPL-CCNC: 51 U/L
ALT SERPL-CCNC: 31 U/L
ANION GAP SERPL CALC-SCNC: 9 MMOL/L (ref 0–18)
AST SERPL-CCNC: 24 U/L (ref 15–37)
BASOPHILS # BLD AUTO: 0.02 X10(3) UL (ref 0–0.2)
BASOPHILS NFR BLD AUTO: 0.3 %
BILIRUB SERPL-MCNC: 0.5 MG/DL (ref 0.1–2)
BUN BLD-MCNC: 15 MG/DL (ref 7–18)
CALCIUM BLD-MCNC: 9.1 MG/DL (ref 8.5–10.1)
CHLORIDE SERPL-SCNC: 102 MMOL/L (ref 98–112)
CO2 SERPL-SCNC: 22 MMOL/L (ref 21–32)
CREAT BLD-MCNC: 1.44 MG/DL
EOSINOPHIL # BLD AUTO: 0 X10(3) UL (ref 0–0.7)
EOSINOPHIL NFR BLD AUTO: 0 %
ERYTHROCYTE [DISTWIDTH] IN BLOOD BY AUTOMATED COUNT: 12.9 %
GLOBULIN PLAS-MCNC: 5 G/DL (ref 2.8–4.4)
GLUCOSE BLD-MCNC: 106 MG/DL (ref 70–99)
HCT VFR BLD AUTO: 50.8 %
HGB BLD-MCNC: 17.5 G/DL
IMM GRANULOCYTES # BLD AUTO: 0.02 X10(3) UL (ref 0–1)
IMM GRANULOCYTES NFR BLD: 0.3 %
LYMPHOCYTES # BLD AUTO: 1.13 X10(3) UL (ref 1–4)
LYMPHOCYTES NFR BLD AUTO: 16.9 %
M PROTEIN MFR SERPL ELPH: 8.8 G/DL (ref 6.4–8.2)
MCH RBC QN AUTO: 27.5 PG (ref 26–34)
MCHC RBC AUTO-ENTMCNC: 34.4 G/DL (ref 31–37)
MCV RBC AUTO: 79.9 FL
MONOCYTES # BLD AUTO: 0.68 X10(3) UL (ref 0.1–1)
MONOCYTES NFR BLD AUTO: 10.2 %
NEUTROPHILS # BLD AUTO: 4.83 X10 (3) UL (ref 1.5–7.7)
NEUTROPHILS # BLD AUTO: 4.83 X10(3) UL (ref 1.5–7.7)
NEUTROPHILS NFR BLD AUTO: 72.3 %
OSMOLALITY SERPL CALC.SUM OF ELEC: 277 MOSM/KG (ref 275–295)
PLATELET # BLD AUTO: 259 10(3)UL (ref 150–450)
POTASSIUM SERPL-SCNC: 4 MMOL/L (ref 3.5–5.1)
RBC # BLD AUTO: 6.36 X10(6)UL
SODIUM SERPL-SCNC: 133 MMOL/L (ref 136–145)
WBC # BLD AUTO: 6.7 X10(3) UL (ref 4–11)

## 2021-08-16 PROCEDURE — 96360 HYDRATION IV INFUSION INIT: CPT

## 2021-08-16 PROCEDURE — 80053 COMPREHEN METABOLIC PANEL: CPT | Performed by: EMERGENCY MEDICINE

## 2021-08-16 PROCEDURE — 96374 THER/PROPH/DIAG INJ IV PUSH: CPT

## 2021-08-16 PROCEDURE — 80053 COMPREHEN METABOLIC PANEL: CPT

## 2021-08-16 PROCEDURE — 99284 EMERGENCY DEPT VISIT MOD MDM: CPT

## 2021-08-16 PROCEDURE — 99453 REM MNTR PHYSIOL PARAM SETUP: CPT

## 2021-08-16 PROCEDURE — 85025 COMPLETE CBC W/AUTO DIFF WBC: CPT | Performed by: EMERGENCY MEDICINE

## 2021-08-16 PROCEDURE — 85025 COMPLETE CBC W/AUTO DIFF WBC: CPT

## 2021-08-16 RX ORDER — ONDANSETRON 2 MG/ML
4 INJECTION INTRAMUSCULAR; INTRAVENOUS ONCE
Status: COMPLETED | OUTPATIENT
Start: 2021-08-16 | End: 2021-08-16

## 2021-08-17 NOTE — ED INITIAL ASSESSMENT (HPI)
Patient sts tested positive for COVID on Friday. Patient is not vaccinated. Patient sts \"I am dehydrated and I need and IV. \" + nausea and vomiting.

## 2021-08-17 NOTE — ED PROVIDER NOTES
Patient Seen in: [facility] Emergency Department      History   Patient presents with:  Covid  Nausea/Vomiting/Diarrhea    Stated Complaint: +COVID, feels dehydrated, v/d    HPI/Subjective:   HPI    Patient presents with Covid and dehydration.   The dry.    ED Course     Labs Reviewed   COMP METABOLIC PANEL (14) - Abnormal; Notable for the following components:       Result Value    Glucose 106 (*)     Sodium 133 (*)     Creatinine 1.44 (*)     Total Protein 8.8 (*)     Globulin  5.0 (*)     A/G Ratio agreed and will receive the infusion and be monitored for 60 minutes after.    They have been advised that they should continue to self-isolate and use infection control measures (e.g., wear mask, isolate, social distance, avoid sharing personal items, anupam

## 2021-09-04 PROBLEM — U07.1 COVID-19: Status: ACTIVE | Noted: 2021-08-10

## 2021-09-23 ENCOUNTER — APPOINTMENT (OUTPATIENT)
Dept: GENERAL RADIOLOGY | Facility: HOSPITAL | Age: 31
DRG: 202 | End: 2021-09-23
Attending: EMERGENCY MEDICINE

## 2021-09-23 ENCOUNTER — HOSPITAL ENCOUNTER (INPATIENT)
Facility: HOSPITAL | Age: 31
LOS: 1 days | Discharge: HOME | DRG: 202 | End: 2021-09-24
Attending: EMERGENCY MEDICINE | Admitting: HOSPITALIST

## 2021-09-23 DIAGNOSIS — J45.42 MODERATE PERSISTENT ASTHMA WITH STATUS ASTHMATICUS: Primary | ICD-10-CM

## 2021-09-23 DIAGNOSIS — R09.02 HYPOXIA: ICD-10-CM

## 2021-09-23 PROCEDURE — 0202U NFCT DS 22 TRGT SARS-COV-2: CPT | Performed by: INTERNAL MEDICINE

## 2021-09-23 PROCEDURE — 84132 ASSAY OF SERUM POTASSIUM: CPT | Performed by: HOSPITALIST

## 2021-09-23 PROCEDURE — 94645 CONT INHLJ TX EACH ADDL HOUR: CPT

## 2021-09-23 PROCEDURE — 99285 EMERGENCY DEPT VISIT HI MDM: CPT

## 2021-09-23 PROCEDURE — 94640 AIRWAY INHALATION TREATMENT: CPT

## 2021-09-23 PROCEDURE — 85025 COMPLETE CBC W/AUTO DIFF WBC: CPT | Performed by: EMERGENCY MEDICINE

## 2021-09-23 PROCEDURE — 71045 X-RAY EXAM CHEST 1 VIEW: CPT | Performed by: EMERGENCY MEDICINE

## 2021-09-23 PROCEDURE — 83735 ASSAY OF MAGNESIUM: CPT | Performed by: EMERGENCY MEDICINE

## 2021-09-23 PROCEDURE — 96374 THER/PROPH/DIAG INJ IV PUSH: CPT

## 2021-09-23 PROCEDURE — 80053 COMPREHEN METABOLIC PANEL: CPT | Performed by: EMERGENCY MEDICINE

## 2021-09-23 PROCEDURE — 96361 HYDRATE IV INFUSION ADD-ON: CPT

## 2021-09-23 PROCEDURE — 94644 CONT INHLJ TX 1ST HOUR: CPT

## 2021-09-23 RX ORDER — MELATONIN
3 NIGHTLY PRN
Status: DISCONTINUED | OUTPATIENT
Start: 2021-09-23 | End: 2021-09-24

## 2021-09-23 RX ORDER — HEPARIN SODIUM 5000 [USP'U]/ML
5000 INJECTION, SOLUTION INTRAVENOUS; SUBCUTANEOUS EVERY 8 HOURS SCHEDULED
Status: DISCONTINUED | OUTPATIENT
Start: 2021-09-23 | End: 2021-09-24

## 2021-09-23 RX ORDER — METHYLPREDNISOLONE SODIUM SUCCINATE 125 MG/2ML
125 INJECTION, POWDER, LYOPHILIZED, FOR SOLUTION INTRAMUSCULAR; INTRAVENOUS EVERY 8 HOURS
Status: DISCONTINUED | OUTPATIENT
Start: 2021-09-23 | End: 2021-09-23

## 2021-09-23 RX ORDER — ALBUTEROL SULFATE 90 UG/1
2 AEROSOL, METERED RESPIRATORY (INHALATION) 4 TIMES DAILY
Status: DISCONTINUED | OUTPATIENT
Start: 2021-09-23 | End: 2021-09-24

## 2021-09-23 RX ORDER — SODIUM CHLORIDE 9 MG/ML
125 INJECTION, SOLUTION INTRAVENOUS CONTINUOUS
Status: DISCONTINUED | OUTPATIENT
Start: 2021-09-23 | End: 2021-09-24

## 2021-09-23 RX ORDER — POTASSIUM CHLORIDE 20 MEQ/1
40 TABLET, EXTENDED RELEASE ORAL ONCE
Status: COMPLETED | OUTPATIENT
Start: 2021-09-23 | End: 2021-09-23

## 2021-09-23 RX ORDER — ACETAMINOPHEN 325 MG/1
650 TABLET ORAL EVERY 6 HOURS PRN
Status: DISCONTINUED | OUTPATIENT
Start: 2021-09-23 | End: 2021-09-24

## 2021-09-23 RX ORDER — PROCHLORPERAZINE EDISYLATE 5 MG/ML
5 INJECTION INTRAMUSCULAR; INTRAVENOUS EVERY 8 HOURS PRN
Status: DISCONTINUED | OUTPATIENT
Start: 2021-09-23 | End: 2021-09-24

## 2021-09-23 RX ORDER — ONDANSETRON 2 MG/ML
4 INJECTION INTRAMUSCULAR; INTRAVENOUS EVERY 4 HOURS PRN
Status: DISCONTINUED | OUTPATIENT
Start: 2021-09-23 | End: 2021-09-23

## 2021-09-23 RX ORDER — METHYLPREDNISOLONE SODIUM SUCCINATE 125 MG/2ML
125 INJECTION, POWDER, LYOPHILIZED, FOR SOLUTION INTRAMUSCULAR; INTRAVENOUS ONCE
Status: COMPLETED | OUTPATIENT
Start: 2021-09-23 | End: 2021-09-23

## 2021-09-23 RX ORDER — MONTELUKAST SODIUM 10 MG/1
10 TABLET ORAL NIGHTLY
Status: DISCONTINUED | OUTPATIENT
Start: 2021-09-23 | End: 2021-09-24

## 2021-09-23 RX ORDER — BISACODYL 10 MG
10 SUPPOSITORY, RECTAL RECTAL
Status: DISCONTINUED | OUTPATIENT
Start: 2021-09-23 | End: 2021-09-24

## 2021-09-23 RX ORDER — BENZONATATE 200 MG/1
200 CAPSULE ORAL 3 TIMES DAILY PRN
Status: DISCONTINUED | OUTPATIENT
Start: 2021-09-23 | End: 2021-09-24

## 2021-09-23 RX ORDER — CETIRIZINE HYDROCHLORIDE 10 MG/1
10 TABLET ORAL DAILY
Refills: 0 | Status: DISCONTINUED | OUTPATIENT
Start: 2021-09-23 | End: 2021-09-24

## 2021-09-23 RX ORDER — POLYETHYLENE GLYCOL 3350 17 G/17G
17 POWDER, FOR SOLUTION ORAL DAILY PRN
Status: DISCONTINUED | OUTPATIENT
Start: 2021-09-23 | End: 2021-09-24

## 2021-09-23 RX ORDER — METHYLPREDNISOLONE SODIUM SUCCINATE 40 MG/ML
40 INJECTION, POWDER, LYOPHILIZED, FOR SOLUTION INTRAMUSCULAR; INTRAVENOUS EVERY 8 HOURS
Status: DISCONTINUED | OUTPATIENT
Start: 2021-09-23 | End: 2021-09-24

## 2021-09-23 RX ORDER — ALBUTEROL SULFATE 2.5 MG/3ML
2.5 SOLUTION RESPIRATORY (INHALATION) EVERY 4 HOURS PRN
Status: DISCONTINUED | OUTPATIENT
Start: 2021-09-23 | End: 2021-09-24

## 2021-09-23 RX ORDER — SODIUM PHOSPHATE, DIBASIC AND SODIUM PHOSPHATE, MONOBASIC 7; 19 G/133ML; G/133ML
1 ENEMA RECTAL ONCE AS NEEDED
Status: DISCONTINUED | OUTPATIENT
Start: 2021-09-23 | End: 2021-09-24

## 2021-09-23 RX ORDER — ONDANSETRON 2 MG/ML
4 INJECTION INTRAMUSCULAR; INTRAVENOUS EVERY 6 HOURS PRN
Status: DISCONTINUED | OUTPATIENT
Start: 2021-09-23 | End: 2021-09-24

## 2021-09-23 RX ORDER — DOCUSATE SODIUM 100 MG/1
100 CAPSULE, LIQUID FILLED ORAL 2 TIMES DAILY
Status: DISCONTINUED | OUTPATIENT
Start: 2021-09-23 | End: 2021-09-24

## 2021-09-23 RX ORDER — IPRATROPIUM BROMIDE AND ALBUTEROL SULFATE 2.5; .5 MG/3ML; MG/3ML
3 SOLUTION RESPIRATORY (INHALATION)
Status: DISCONTINUED | OUTPATIENT
Start: 2021-09-23 | End: 2021-09-24

## 2021-09-23 RX ORDER — SODIUM CHLORIDE 9 MG/ML
INJECTION, SOLUTION INTRAVENOUS CONTINUOUS
Status: ACTIVE | OUTPATIENT
Start: 2021-09-23 | End: 2021-09-23

## 2021-09-23 NOTE — ED QUICK NOTES
Orders for admission, patient is aware of plan and ready to go upstairs. Any questions, please call ED ALEJANDRA Cevallos  at extension [phone]. Vaccinated?  Unknown  Type of COVID test sent:PCR  COVID Suspicion level:- low Low/High      Titratable drug(s) infusing

## 2021-09-23 NOTE — ED PROVIDER NOTES
Patient Seen in: [facility] Emergency Department      History   Patient presents with:  Difficulty Breathing    Stated Complaint: Difficulty breathing from asthma, sats 90%    Subjective:   HPI    Patient is a 42-year-old male, with history of asthm are intact. Pupils are equal and reactive to light. NECK: Neck is supple and nontender. The trachea is midline. LUNGS: Lungs are diminished bilaterally, respirations are labored. HEART: Tachycardic and regular. There are no rubs or gallops.    ABDOMEN asthma, sats 90%  PATIENT STATED HISTORY: (As transcribed by Technologist)  Patient has difficulty breathing that got worse last night. FINDINGS:  Bibasilar bronchovascular opacities most consistent with atelectasis.   No focal consolidation, pleural eff Disposition and Plan     Clinical Impression:  Moderate persistent asthma with status asthmaticus  (primary encounter diagnosis)  Hypoxia     Disposition:  Admit  9/23/2021 10:19 am    Follow-up:  No follow-up provider specified.         Med

## 2021-09-23 NOTE — PROGRESS NOTES
NURSING ADMISSION NOTE      Patient admitted via Cart  Oriented to room. Safety precautions initiated. Bed in low position. Call light in reach. received pt from ED. Pt AOx4. VSS on 2L. Baseline RA. Navigator completed w pt. Denies pain.  Shayy Montalvo

## 2021-09-23 NOTE — ED QUICK NOTES
Pt desat down to 88% on room air after hour long neb treatment, placed on 2 L NC and will continue to monitor

## 2021-09-23 NOTE — CONSULTS
Pulmonary Consult     Assessment / Plan:  1. Acute exacerbation of eosinophilic asthma  -IV steroids   -BD per protocol   -continue Breo in lieu of Advair   -CXR clear   2. Acute hypoxemia  -due to above  -no evidence of PNA on CXR  -rapid negative.  COVID Aerosol Powder, Breath Activated, Inhale 1 puff into the lungs 2 (two) times daily. , Disp: 180 each, Rfl: 0  albuterol sulfate (2.5 MG/3ML) 0.083% Inhalation Nebu Soln, Take 3 mL (2.5 mg total) by nebulization every 6 (six) hours as needed for Wheezing., D

## 2021-09-23 NOTE — H&P
[facility] Hospitalist H&P       CC: Patient presents with:  Difficulty Breathing       PCP: Marlee Smith MD    History of Present Illness:    Patient is a 33-year-old with severe persistent asthma, allergies presented with cough, shortness of breath that Rfl: 0  Albuterol Sulfate  (90 Base) MCG/ACT Inhalation Aero Soln, Inhale 2 puffs into the lungs every 4 (four) hours as needed for Wheezing., Disp: 1 each, Rfl: 3  montelukast 10 MG Oral Tab, Take 1 tablet (10 mg total) by mouth nightly., Disp: 30 147/94  09/03/21 : 124/80  08/16/21 : 110/71    Wt Readings from Last 3 Encounters:  09/23/21 : 240 lb (108.9 kg)  08/16/21 : 240 lb (108.9 kg)  04/29/21 : 250 lb (113.4 kg)      Wt Readings from Last 6 Encounters:  09/23/21 : 240 lb (108.9 kg)  08/16/21 : severe persistent asthma, allergies presented with cough, shortness of breath -acute asthma exacerbation    #Acute hypoxic respiratory failure  #Acute asthma exacerbation  #Severe persistent asthma  Presented with shortness of breath, hypoxemia to 8 3%, ta

## 2021-09-24 VITALS
BODY MASS INDEX: 33 KG/M2 | HEART RATE: 101 BPM | WEIGHT: 240 LBS | RESPIRATION RATE: 20 BRPM | TEMPERATURE: 98 F | OXYGEN SATURATION: 99 % | DIASTOLIC BLOOD PRESSURE: 84 MMHG | SYSTOLIC BLOOD PRESSURE: 131 MMHG

## 2021-09-24 PROCEDURE — 94640 AIRWAY INHALATION TREATMENT: CPT

## 2021-09-24 PROCEDURE — 80053 COMPREHEN METABOLIC PANEL: CPT | Performed by: HOSPITALIST

## 2021-09-24 PROCEDURE — 94664 DEMO&/EVAL PT USE INHALER: CPT

## 2021-09-24 PROCEDURE — 85025 COMPLETE CBC W/AUTO DIFF WBC: CPT | Performed by: HOSPITALIST

## 2021-09-24 RX ORDER — PREDNISONE 10 MG/1
TABLET ORAL
Qty: 30 TABLET | Refills: 0 | Status: SHIPPED
Start: 2021-09-24 | End: 2021-10-11 | Stop reason: ALTCHOICE

## 2021-09-24 RX ORDER — ALBUTEROL SULFATE 2.5 MG/3ML
2.5 SOLUTION RESPIRATORY (INHALATION) EVERY 4 HOURS PRN
Qty: 270 ML | Refills: 3 | Status: SHIPPED | OUTPATIENT
Start: 2021-09-24 | End: 2022-09-19

## 2021-09-24 NOTE — PROGRESS NOTES
NURSING DISCHARGE NOTE    Discharged Home via Ambulatory. Accompanied by Family member  Belongings Taken by patient/family. Tele and IV removed. Discharge education completed. No further questions. Belongings with pt.   Pt escorted by family blaze

## 2021-09-24 NOTE — PROGRESS NOTES
Received patient alert and orientated, oxygen WNL on 2L-room air, . ST per tele. Pt with complaints of headache this shift, PRN tylenol given. Voiding. Up ad isabel. Instructed to call for help, call light within reach. Pt updated on POC.  WCTM

## 2021-09-24 NOTE — PROGRESS NOTES
Pulmonary Progress Note        NAME: Marlo Reaves - ROOM: 648/156-X - MRN: [id number] - Age: 36year old - : 7/3/1990    Past Medical History:   Diagnosis Date   • Allergic rhinitis    • Asthma          SUBJECTIVE: No new events overnight, feels better 9.3     --  137   K 3.1* 4.3 4.1     --  107   CO2 24.0  --  22.0         ASSESSMENT/PLAN:    1. Acute hypoxemic respiratory failure due to asthma exacerbation. He is much better. Off O2 at rest   2.  Asthma exacerbation due to RSV  -pred 12 d

## 2021-09-24 NOTE — PLAN OF CARE
Problem: Patient/Family Goals  Goal: Patient/Family Long Term Goal  Description: Patient's Long Term Goal: 9/23 (noc) to go home    Interventions:  - Steroids, nebs  - See additional Care Plan goals for specific interventions  Outcome: Progressing  Goal:

## 2021-09-24 NOTE — PLAN OF CARE
Problem: Patient/Family Goals  Goal: Patient/Family Long Term Goal  Description: Patient's Long Term Goal: Go home    Interventions:  - Nebs/inhalers  - Steroids  - See additional Care Plan goals for specific interventions  Outcome: Progressing  Goal: Pa

## 2021-09-25 NOTE — DISCHARGE SUMMARY
General Medicine Discharge Summary     Patient ID:  Austin Andrade  36year old  7/3/1990    Admit date: 9/23/2021    Discharge date and time: 9/24/2021  1:12 PM     Attending Physician: No att. providers found     Primary Care Physician: Michael Smith, failure  #Acute asthma exacerbation  #Severe persistent asthma  Presented with shortness of breath, hypoxemia to 8 3%, tachypnea  -initallly on 4L, now on RA  -Albuterol nebulizers every 4 hours as needed, Solu-Medrol 125 3 times-changed to prednisone tape Disp-180 each, R-0    !! albuterol sulfate (2.5 MG/3ML) 0.083% Inhalation Nebu Soln  Take 3 mL (2.5 mg total) by nebulization every 6 (six) hours as needed for Wheezing., Normal, Disp-360 mL, R-5    benzonatate 200 MG Oral Cap  Take 1 capsule (200 mg total

## 2024-05-30 NOTE — PLAN OF CARE
Chronic stable problem.  Continue glucotrol XL 5 mg daily   METABOLIC/FLUID AND ELECTROLYTES - ADULT    • Electrolytes maintained within normal limits Adequate for Discharge        Patient/Family Goals    • Patient/Family Long Term Goal Adequate for Discharge    • Patient/Family Short Term Goal Adequate for Dischar

## (undated) NOTE — LETTER
Date: 1/3/2020    Patient Name: Derek Benitez          To Whom it may concern: The above patient was seen at the [facility] for treatment of a medical condition. Mr. Melissa Leonard was hospitalized from 12/31/19-1/3/2020.  He should be excused f

## (undated) NOTE — ED AVS SNAPSHOT
Lesli Walker   MRN: [id number]    Department:  [facility] Emergency Department   Date of Visit:  1/11/2019           Disclosure     Insurance plans vary and the physician(s) referred by the ER may not be covered by your plan.  Please contact your tell this physician (or your personal doctor if your instructions are to return to your personal doctor) about any new or lasting problems. The primary care or specialist physician will see patients referred from the [facility] Emergency Department.  Arthor Bernheim

## (undated) NOTE — LETTER
01/03/20    Mills Marcoms      To Whom It May Concern:     This letter has been written at the patient's request. The above patient was seen at [facility] for treatment of a medical condition from 12/31/2019    The patient may return to work/school on

## (undated) NOTE — ED AVS SNAPSHOT
Shakila Alonzo   MRN: [id number]    Department:  [facility] Emergency Department   Date of Visit:  7/20/2018           Disclosure     Insurance plans vary and the physician(s) referred by the ER may not be covered by your plan.  Please contact your tell this physician (or your personal doctor if your instructions are to return to your personal doctor) about any new or lasting problems. The primary care or specialist physician will see patients referred from the [facility] Emergency Department.  Nini Moore